# Patient Record
Sex: FEMALE | Race: WHITE | Employment: FULL TIME | URBAN - METROPOLITAN AREA
[De-identification: names, ages, dates, MRNs, and addresses within clinical notes are randomized per-mention and may not be internally consistent; named-entity substitution may affect disease eponyms.]

---

## 2017-03-22 ENCOUNTER — OFFICE VISIT (OUTPATIENT)
Dept: PRIMARY CARE CLINIC | Age: 47
End: 2017-03-22

## 2017-03-22 VITALS
SYSTOLIC BLOOD PRESSURE: 136 MMHG | TEMPERATURE: 98.2 F | HEIGHT: 64 IN | OXYGEN SATURATION: 98 % | BODY MASS INDEX: 28.89 KG/M2 | DIASTOLIC BLOOD PRESSURE: 87 MMHG | HEART RATE: 89 BPM | RESPIRATION RATE: 16 BRPM | WEIGHT: 169.2 LBS

## 2017-03-22 DIAGNOSIS — S29.011A CHEST WALL MUSCLE STRAIN, INITIAL ENCOUNTER: Primary | ICD-10-CM

## 2017-03-22 NOTE — PROGRESS NOTES
Chief Complaint   Patient presents with    Chest Pain     Chest discomfort on the left side of the chest and radiating down left arm for past two days. Tight left neck. Felt more with deep breaths

## 2017-03-22 NOTE — PROGRESS NOTES
Subjective:   Jaja Chaudhry is a 41 y.o. female who complains of left chest discomfort for 2 days, stable since that time. The pain is described as \"aching\". The pain is felt when twisting and reaching for something. She denies any nausea, SOB, sweating. 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 She reports a history of chest muscle strains, usually affecting left side, and relates it may be due to having previous L shoulder surgery. She has not taken anything for pain. She reports being under a lot of stress lately, reports \"good stress\". Recently drove on a long, tense drive and is getting a new job which is confidential at this time. She decided to come in today as she had her biometric screening here. Denies history of HTN but has had a few high readings in past.  Tries to eat well and do yoga which she plans to do this evening. Mostly exercises in warmer months. She is a non-smoker. Denies first degree relative with heart disease. Grandfather  of MI age 39. BP and pulse was checked today at school - BP - 140/84, P - 80    Past Medical History:   Diagnosis Date    Asthma      Past Surgical History:   Procedure Laterality Date    HX HEENT      wisdom teeth extraction    HX ORTHOPAEDIC      left knee arthroscopy    HX ORTHOPAEDIC      left shoulder arthroscopy    HX ORTHOPAEDIC      left foot schwanoma excision     Allergies   Allergen Reactions    Penicillins Nausea Only    Sulfa (Sulfonamide Antibiotics) Nausea Only       Review of Systems  Pertinent items are noted in HPI.     Objective:     Visit Vitals    /87    Pulse 89    Temp 98.2 °F (36.8 °C) (Oral)    Resp 16    Ht 5' 4\" (1.626 m)    Wt 169 lb 3.2 oz (76.7 kg)    SpO2 98%    BMI 29.04 kg/m2     General:  alert, cooperative, no distress   Eyes: negative   Ears: normal TM's and external ear canals AU   Sinuses: Normal paranasal sinuses without tenderness   Mouth:  Lips, mucosa, and tongue normal. Teeth and gums normal and normal findings: oropharynx pink & moist without lesions or evidence of thrush   Neck: supple, symmetrical, trachea midline and no adenopathy. Heart: S1 and S2 normal, no murmurs noted. Mild tenderness to palpation to left anterior chest 4th-5th rib area, mid-clavicular line   Lungs: clear to auscultation bilaterally        12 lead EKG - WNL    Assessment/Plan:       ICD-10-CM ICD-9-CM    1. Chest wall muscle strain, initial encounter S29.011A 848.8 AMB POC EKG ROUTINE W/ 12 LEADS, INTER & REP     Unable to access biometric screening data (Glu/chol) as this data is not in CC. Recommend Ibuprofen or Aleve and warm moist compresses to chest.  If any worsening or severe CP, pt was advised to go to ED. Recommend full CPE with PCP in near future. RTC prn.       Modesta Wade, PIERRE

## 2017-03-22 NOTE — PATIENT INSTRUCTIONS
Musculoskeletal Chest Pain: Care Instructions  Your Care Instructions  Chest pain is not always a sign that something is wrong with your heart or that you have another serious problem. The doctor thinks your chest pain is caused by strained muscles or ligaments, inflamed chest cartilage, or another problem in your chest, rather than by your heart. You may need more tests to find the cause of your chest pain. Follow-up care is a key part of your treatment and safety. Be sure to make and go to all appointments, and call your doctor if you are having problems. Its also a good idea to know your test results and keep a list of the medicines you take. How can you care for yourself at home? · Take pain medicines exactly as directed. ¨ If the doctor gave you a prescription medicine for pain, take it as prescribed. ¨ If you are not taking a prescription pain medicine, ask your doctor if you can take an over-the-counter medicine. · Rest and protect the sore area. · Stop, change, or take a break from any activity that may be causing your pain or soreness. · Put ice or a cold pack on the sore area for 10 to 20 minutes at a time. Try to do this every 1 to 2 hours for the next 3 days (when you are awake) or until the swelling goes down. Put a thin cloth between the ice and your skin. · After 2 or 3 days, apply a heating pad set on low or a warm cloth to the area that hurts. Some doctors suggest that you go back and forth between hot and cold. · Do not wrap or tape your ribs for support. This may cause you to take smaller breaths, which could increase your risk of lung problems. · Mentholated creams such as Bengay or Icy Hot may soothe sore muscles. Follow the instructions on the package. · Follow your doctor's instructions for exercising. · Gentle stretching and massage may help you get better faster. Stretch slowly to the point just before pain begins, and hold the stretch for at least 15 to 30 seconds.  Do this 3 or 4 times a day. Stretch just after you have applied heat. · As your pain gets better, slowly return to your normal activities. Any increased pain may be a sign that you need to rest a while longer. When should you call for help? Call 911 anytime you think you may need emergency care. For example, call if:  · You have chest pain or pressure. This may occur with:  ¨ Sweating. ¨ Shortness of breath. ¨ Nausea or vomiting. ¨ Pain that spreads from the chest to the neck, jaw, or one or both shoulders or arms. ¨ Dizziness or lightheadedness. ¨ A fast or uneven pulse. After calling 911, chew 1 adult-strength aspirin. Wait for an ambulance. Do not try to drive yourself. · You have sudden chest pain and shortness of breath, or you cough up blood. Call your doctor now or seek immediate medical care if:  · You have any trouble breathing. · Your chest pain gets worse. · Your chest pain occurs consistently with exercise and is relieved by rest.  Watch closely for changes in your health, and be sure to contact your doctor if:  · Your chest pain does not get better after 1 week. Where can you learn more? Go to http://donald-mg.info/. Enter V293 in the search box to learn more about \"Musculoskeletal Chest Pain: Care Instructions. \"  Current as of: May 27, 2016  Content Version: 11.1  © 9666-4000 Healthwise, Incorporated. Care instructions adapted under license by [employer] (which disclaims liability or warranty for this information). If you have questions about a medical condition or this instruction, always ask your healthcare professional. [place] [address] any warranty or liability for your use of this information.

## 2017-03-22 NOTE — MR AVS SNAPSHOT
Visit Information Date & Time Provider Department Dept. Phone Encounter #  
 3/22/2017  3:00 PM David Parker NP [address] [facility] 90 [phone] Follow-up Instructions Return if symptoms worsen or fail to improve. Upcoming Health Maintenance Date Due DTaP/Tdap/Td series (1 - Tdap) 2/10/1993 PAP AKA CERVICAL CYTOLOGY 2/10/1993 INFLUENZA AGE 9 TO ADULT 8/1/2016 Allergies as of 3/22/2017  Review Complete On: 3/22/2017 By: David Parker NP Severity Noted Reaction Type Reactions Penicillins  04/05/2014   Side Effect Nausea Only Sulfa (Sulfonamide Antibiotics)  04/05/2014   Side Effect Nausea Only Current Immunizations  Never Reviewed Name Date Influenza Vaccine PF 10/6/2015 Not reviewed this visit You Were Diagnosed With   
  
 Codes Comments Chest wall muscle strain, initial encounter    -  Primary ICD-10-CM: B72.729Q ICD-9-CM: 465. 8 Vitals BP Pulse Temp Resp Height(growth percentile) Weight(growth percentile) 136/87 89 98.2 °F (36.8 °C) (Oral) 16 5' 4\" (1.626 m) 169 lb 3.2 oz (76.7 kg) SpO2 BMI OB Status Smoking Status 98% 29.04 kg/m2 Medically Induced Never Smoker Vitals History BMI and BSA Data Body Mass Index Body Surface Area 29.04 kg/m 2 1.86 m 2 Preferred Pharmacy Pharmacy Name Phone [facility] [address] [place] [address] [address] [facility], [place] [address] [phone] Your Updated Medication List  
  
   
This list is accurate as of: 3/22/17  3:54 PM.  Always use your most recent med list.  
  
  
  
  
 albuterol 90 mcg/actuation inhaler Commonly known as:  PROVENTIL HFA, VENTOLIN HFA, PROAIR HFA Take  by inhalation. CLARITIN 10 mg tablet Generic drug:  loratadine Take 10 mg by mouth daily. norethindrone-ethinyl estradiol 1 mg-20 mcg (21)/75 mg (7) Tab Commonly known as:  Gib Kurk FE 1/20 Take  by mouth. SINGULAIR 10 mg tablet Generic drug:  montelukast  
Take 10 mg by mouth daily. We Performed the Following AMB POC EKG ROUTINE W/ 12 LEADS, INTER & REP [15710 CPT(R)] Follow-up Instructions Return if symptoms worsen or fail to improve. Patient Instructions Musculoskeletal Chest Pain: Care Instructions Your Care Instructions Chest pain is not always a sign that something is wrong with your heart or that you have another serious problem. The doctor thinks your chest pain is caused by strained muscles or ligaments, inflamed chest cartilage, or another problem in your chest, rather than by your heart. You may need more tests to find the cause of your chest pain. Follow-up care is a key part of your treatment and safety. Be sure to make and go to all appointments, and call your doctor if you are having problems. Its also a good idea to know your test results and keep a list of the medicines you take. How can you care for yourself at home? · Take pain medicines exactly as directed. ¨ If the doctor gave you a prescription medicine for pain, take it as prescribed. ¨ If you are not taking a prescription pain medicine, ask your doctor if you can take an over-the-counter medicine. · Rest and protect the sore area. · Stop, change, or take a break from any activity that may be causing your pain or soreness. · Put ice or a cold pack on the sore area for 10 to 20 minutes at a time. Try to do this every 1 to 2 hours for the next 3 days (when you are awake) or until the swelling goes down. Put a thin cloth between the ice and your skin. · After 2 or 3 days, apply a heating pad set on low or a warm cloth to the area that hurts. Some doctors suggest that you go back and forth between hot and cold. · Do not wrap or tape your ribs for support. This may cause you to take smaller breaths, which could increase your risk of lung problems. · Mentholated creams such as Bengay or Icy Hot may soothe sore muscles. Follow the instructions on the package. · Follow your doctor's instructions for exercising. · Gentle stretching and massage may help you get better faster. Stretch slowly to the point just before pain begins, and hold the stretch for at least 15 to 30 seconds. Do this 3 or 4 times a day. Stretch just after you have applied heat. · As your pain gets better, slowly return to your normal activities. Any increased pain may be a sign that you need to rest a while longer. When should you call for help? Call 911 anytime you think you may need emergency care. For example, call if: 
· You have chest pain or pressure. This may occur with: ¨ Sweating. ¨ Shortness of breath. ¨ Nausea or vomiting. ¨ Pain that spreads from the chest to the neck, jaw, or one or both shoulders or arms. ¨ Dizziness or lightheadedness. ¨ A fast or uneven pulse. After calling 911, chew 1 adult-strength aspirin. Wait for an ambulance. Do not try to drive yourself. · You have sudden chest pain and shortness of breath, or you cough up blood. Call your doctor now or seek immediate medical care if: 
· You have any trouble breathing. · Your chest pain gets worse. · Your chest pain occurs consistently with exercise and is relieved by rest. 
Watch closely for changes in your health, and be sure to contact your doctor if: 
· Your chest pain does not get better after 1 week. Where can you learn more? Go to http://donald-mg.info/. Enter V293 in the search box to learn more about \"Musculoskeletal Chest Pain: Care Instructions. \" Current as of: May 27, 2016 Content Version: 11.1 © 8373-3445 y prime. Care instructions adapted under license by [employer] (which disclaims liability or warranty for this information).  If you have questions about a medical condition or this instruction, always ask your healthcare professional. [address] any warranty or liability for your use of this information. Introducing [facility] & HEALTH SERVICES! Farooq Hercules introduces Woqu.com patient portal. Now you can access parts of your medical record, email your doctor's office, and request medication refills online. 1. In your internet browser, go to [email or web address] in My [email or web address] 2. Click on the First Time User? Click Here link in the Sign In box. You will see the New Member Sign Up page. 3. Enter your [email or web address].com Access Code exactly as it appears below. You will not need to use this code after youve completed the sign-up process. If you do not sign up before the expiration date, you must request a new code. · [email or web address] Access Code: [email or web address] Expires: 6/20/2017  3:36 PM 
 
4. Enter the last four digits of your Social Security Number (xxxx) and Date of Birth (mm/dd/yyyy) as indicated and click Submit. You will be taken to the next sign-up page. 5. Create a [email or web address] ID. This will be your [email or web address] login ID and cannot be changed, so think of one that is secure and easy to remember. 6. Create a Woqu.com password. You can change your password at any time. 7. Enter your Password Reset Question and Answer. This can be used at a later time if you forget your password. 8. Enter your e-mail address. You will receive e-mail notification when new information is available in [address] 9. Click Sign Up. You can now view and download portions of your medical record. 10. Click the Download Summary menu link to download a portable copy of your medical information. If you have questions, please visit the Frequently Asked Questions section of the [email or web address] website. Remember, Woqu.com is NOT to be used for urgent needs. For medical emergencies, dial 911. Now available from your iPhone and Android! Please provide this summary of care documentation to your next provider. Your primary care clinician is listed as Trinh Hartmann. If you have any questions after today's visit, please call [phone].

## 2017-05-30 ENCOUNTER — OFFICE VISIT (OUTPATIENT)
Dept: PRIMARY CARE CLINIC | Age: 47
End: 2017-05-30

## 2017-05-30 VITALS
TEMPERATURE: 98.3 F | WEIGHT: 173.2 LBS | HEIGHT: 64 IN | RESPIRATION RATE: 16 BRPM | BODY MASS INDEX: 29.57 KG/M2 | HEART RATE: 75 BPM | SYSTOLIC BLOOD PRESSURE: 131 MMHG | DIASTOLIC BLOOD PRESSURE: 88 MMHG | OXYGEN SATURATION: 98 %

## 2017-05-30 DIAGNOSIS — L02.91 ABSCESS: Primary | ICD-10-CM

## 2017-05-30 RX ORDER — DOXYCYCLINE 100 MG/1
100 CAPSULE ORAL 2 TIMES DAILY
Qty: 28 CAP | Refills: 0 | Status: SHIPPED | OUTPATIENT
Start: 2017-05-30 | End: 2017-06-07 | Stop reason: SDUPTHER

## 2017-05-30 NOTE — PROGRESS NOTES
Chief Complaint   Patient presents with   [address]     c/o tick bite on L breast, unsure of when she noticed, states school nurse this morning marked with marker, states she did use some otc topical ointment ot help      This note will not be viewable in [address]

## 2017-05-30 NOTE — PATIENT INSTRUCTIONS

## 2017-05-30 NOTE — MR AVS SNAPSHOT
Visit Information Date & Time Provider Department Dept. Phone Encounter #  
 5/30/2017  3:15 PM Keyanna Ocasio, [address] [place] [phone] Follow-up Instructions Return if symptoms worsen or fail to improve. Upcoming Health Maintenance Date Due DTaP/Tdap/Td series (1 - Tdap) 2/10/1993 PAP AKA CERVICAL CYTOLOGY 2/10/1993 INFLUENZA AGE 9 TO ADULT 8/1/2017 Allergies as of 5/30/2017  Review Complete On: 5/30/2017 By: Will Diaz LPN Severity Noted Reaction Type Reactions Penicillins  04/05/2014   Side Effect Nausea Only Sulfa (Sulfonamide Antibiotics)  04/05/2014   Side Effect Nausea Only Current Immunizations  Never Reviewed Name Date Influenza Vaccine PF 10/6/2015 Not reviewed this visit You Were Diagnosed With   
  
 Codes Comments Abscess    -  Primary ICD-10-CM: L02.91 
ICD-9-CM: 928. 9 Vitals BP Pulse Temp Resp Height(growth percentile) Weight(growth percentile) 131/88 (BP 1 Location: Left arm, BP Patient Position: Sitting) 75 98.3 °F (36.8 °C) (Oral) 16 5' 4\" (1.626 m) 173 lb 3.2 oz (78.6 kg) SpO2 BMI OB Status Smoking Status 98% 29.73 kg/m2 Medically Induced Never Smoker Vitals History BMI and BSA Data Body Mass Index Body Surface Area  
 29.73 kg/m 2 1.88 m 2 Preferred Pharmacy Pharmacy Name Phone [address] [place] [address], [address] [place] [address] Carlie Small Said [phone] Your Updated Medication List  
  
   
This list is accurate as of: 5/30/17  4:15 PM.  Always use your most recent med list.  
  
  
  
  
 albuterol 90 mcg/actuation inhaler Commonly known as:  PROVENTIL HFA, VENTOLIN HFA, PROAIR HFA Take 2 Puffs by inhalation every six (6) hours as needed. CLARITIN 10 mg tablet Generic drug:  loratadine Take 10 mg by mouth daily. doxycycline 100 mg capsule Commonly known as:  Julisa Monteiro Take 1 Cap by mouth two (2) times a day for 14 days. norethindrone-ethinyl estradiol 1 mg-20 mcg (21)/75 mg (7) Tab Commonly known as:  [place] Marianne FE 1/20 Take 1 Tab by mouth daily. SINGULAIR 10 mg tablet Generic drug:  montelukast  
Take 10 mg by mouth daily. Prescriptions Sent to Pharmacy Refills  
 doxycycline (MONODOX) 100 mg capsule 0 Sig: Take 1 Cap by mouth two (2) times a day for 14 days. Class: Normal  
 Pharmacy: Usman Lopez  at [address] #: [phone] Route: Oral  
  
Follow-up Instructions Return if symptoms worsen or fail to improve. Patient Instructions Skin Abscess: Care Instructions Your Care Instructions A skin abscess is a bacterial infection that forms a pocket of pus. A boil is a kind of skin abscess. The doctor may have cut an opening in the abscess so that the pus can drain out. You may have gauze in the cut so that the abscess will stay open and keep draining. You may need antibiotics. You will need to follow up with your doctor to make sure the infection has gone away. The doctor has checked you carefully, but problems can develop later. If you notice any problems or new symptoms, get medical treatment right away. Follow-up care is a key part of your treatment and safety. Be sure to make and go to all appointments, and call your doctor if you are having problems. It's also a good idea to know your test results and keep a list of the medicines you take. How can you care for yourself at home? · Apply warm and dry compresses, a heating pad set on low, or a hot water bottle 3 or 4 times a day for pain. Keep a cloth between the heat source and your skin. · If your doctor prescribed antibiotics, take them as directed. Do not stop taking them just because you feel better. You need to take the full course of antibiotics. · Take pain medicines exactly as directed. ¨ If the doctor gave you a prescription medicine for pain, take it as prescribed. ¨ If you are not taking a prescription pain medicine, ask your doctor if you can take an over-the-counter medicine. · Keep your bandage clean and dry. Change the bandage whenever it gets wet or dirty, or at least one time a day. · If the abscess was packed with gauze: 
¨ Keep follow-up appointments to have the gauze changed or removed. If the doctor instructed you to remove the gauze, gently pull out all of the gauze when your doctor tells you to. ¨ After the gauze is removed, soak the area in warm water for 15 to 20 minutes 2 times a day, until the wound closes. When should you call for help? Call your doctor now or seek immediate medical care if: 
· You have signs of worsening infection, such as: 
¨ Increased pain, swelling, warmth, or redness. ¨ Red streaks leading from the infected skin. ¨ Pus draining from the wound. ¨ A fever. Watch closely for changes in your health, and be sure to contact your doctor if: 
· You do not get better as expected. Where can you learn more? Go to http://donald-mg.info/. Enter Z411 in the search box to learn more about \"Skin Abscess: Care Instructions. \" Current as of: October 13, 2016 Content Version: 11.2 © 1488-4760 SpeechVive. Care instructions adapted under license by [employer] (which disclaims liability or warranty for this information). If you have questions about a medical condition or this instruction, always ask your healthcare professional. [place] [address] any warranty or liability for your use of this information. Introducing [facility]! Delvin Kline introduces [employer] patient portal. Now you can access parts of your medical record, email your doctor's office, and request medication refills online. 1. In your internet browser, go to [email or web address] [email or web address]/[email or web address] Media 2. Click on the First Time User? Click Here link in the Sign In box. You will see the New Member Sign Up page. 3. Enter your [employer] Access Code exactly as it appears below. You will not need to use this code after youve completed the sign-up process. If you do not sign up before the expiration date, you must request a new code. · [employer] Access Code: [email or web address] Expires: 6/20/2017  3:36 PM 
 
4. Enter the last four digits of your Social Security Number (xxxx) and Date of Birth (mm/dd/yyyy) as indicated and click Submit. You will be taken to the next sign-up page. 5. Create a [employer] ID. This will be your [employer] login ID and cannot be changed, so think of one that is secure and easy to remember. 6. Create a [employer] password. You can change your password at any time. 7. Enter your Password Reset Question and Answer. This can be used at a later time if you forget your password. 8. Enter your e-mail address. You will receive e-mail notification when new information is available in [address] 9. Click Sign Up. You can now view and download portions of your medical record. 10. Click the Download Summary menu link to download a portable copy of your medical information. If you have questions, please visit the Frequently Asked Questions section of the [employer] website. Remember, [employer] is NOT to be used for urgent needs. For medical emergencies, dial 911. Now available from your iPhone and Android! Please provide this summary of care documentation to your next provider. Your primary care clinician is listed as Jerman Phelps. If you have any questions after today's visit, please call [phone].

## 2017-06-02 NOTE — PROGRESS NOTES
Subjective:      Alvaro Blanchard is an 41 y.o. female who presents for evaluation of a bite wound to the left breast. History of bite: possible tick bite. The bite  occurred 2 days ago. The patient reports no numbness. There were not other injuries. The tetanus status is up to date. Patient Active Problem List   Diagnosis Code   (none) - all problems resolved or deleted     There are no active problems to display for this patient. Current Outpatient Prescriptions   Medication Sig Dispense Refill    doxycycline (MONODOX) 100 mg capsule Take 1 Cap by mouth two (2) times a day for 14 days. 28 Cap 0    albuterol (PROVENTIL HFA, VENTOLIN HFA, PROAIR HFA) 90 mcg/actuation inhaler Take 2 Puffs by inhalation every six (6) hours as needed.  norethindrone-ethinyl estradiol (JUNEL FE 1/20) 1 mg-20 mcg (21)/75 mg (7) per tablet Take 1 Tab by mouth daily.  montelukast (SINGULAIR) 10 mg tablet Take 10 mg by mouth daily.  loratadine (CLARITIN) 10 mg tablet Take 10 mg by mouth daily.        Allergies   Allergen Reactions    Penicillins Nausea Only    Sulfa (Sulfonamide Antibiotics) Nausea Only     Past Medical History:   Diagnosis Date    Asthma      Past Surgical History:   Procedure Laterality Date    HX HEENT      wisdom teeth extraction    HX ORTHOPAEDIC      left knee arthroscopy    HX ORTHOPAEDIC      left shoulder arthroscopy    HX ORTHOPAEDIC      left foot schwanoma excision     Family History   Problem Relation Age of Onset    Cancer Father     Other Brother      cerebral palsy     No Known Problems Mother     No Known Problems Sister     No Known Problems Maternal Aunt     No Known Problems Maternal Uncle     No Known Problems Paternal Aunt     No Known Problems Paternal Uncle     No Known Problems Maternal Grandmother     No Known Problems Maternal Grandfather     No Known Problems Paternal Grandmother     No Known Problems Paternal Grandfather      Social History   Substance Use Topics    Smoking status: Never Smoker    Smokeless tobacco: Never Used    Alcohol use Yes      Comment: occasional        Objective:        Visit Vitals    /88 (BP 1 Location: Left arm, BP Patient Position: Sitting)    Pulse 75    Temp 98.3 °F (36.8 °C) (Oral)    Resp 16    Ht 5' 4\" (1.626 m)    Wt 173 lb 3.2 oz (78.6 kg)    SpO2 98%    BMI 29.73 kg/m2     There is a 8cm x 5 cm area of erythemia with a deep darker red area of swelling and redness in center area. Examination of the wound for foreign bodies and devitalized tissue showed none. Examination of the surrounding area for neural or vascular damage showed none. Assessment/Plan:   Bite as described above. This is more swollen and red than I would usually expect to see on a bite of anykind therefore I am goinf to treat her with doxycycline 100 mg BID for 10-14 days. If the area has any increasing redness or swelling, she will need to see the ED or a surgeon for further evaluation of the area. Tetanus immunization indicated: yes  Rabies risk: Minimal risk. The wound was dressed and antibiotic ointment was applied. ICD-10-CM ICD-9-CM    1.  Abscess L02.91 682.9 doxycycline (MONODOX) 100 mg capsule

## 2017-06-06 ENCOUNTER — OFFICE VISIT (OUTPATIENT)
Dept: PRIMARY CARE CLINIC | Age: 47
End: 2017-06-06

## 2017-06-06 VITALS
WEIGHT: 173.8 LBS | HEIGHT: 64 IN | RESPIRATION RATE: 16 BRPM | OXYGEN SATURATION: 99 % | DIASTOLIC BLOOD PRESSURE: 85 MMHG | HEART RATE: 85 BPM | SYSTOLIC BLOOD PRESSURE: 134 MMHG | TEMPERATURE: 98.3 F | BODY MASS INDEX: 29.67 KG/M2

## 2017-06-06 DIAGNOSIS — L02.91 ABSCESS: Primary | ICD-10-CM

## 2017-06-06 NOTE — PROGRESS NOTES
Subjective:      Rock Iverson is an 41 y.o. female who presents for evaluation of a probable skin infection located on the left breast. She was here 1 week ago and was started on doxycycline and warm compresses. The abscess has localized and come up to the surface of her skin, however it has no drainage. The area is now 3cmx 2cm of red skin with a central hole, not quite open. Patient denies  drainage, fever, chills, nausea and vomiting. There is not a history trauma to the area. Treatment to date has included warm compresses and antibiotics started 7 days ago with moderate relief. Patient Active Problem List   Diagnosis Code   (none) - all problems resolved or deleted     There are no active problems to display for this patient. Current Outpatient Prescriptions   Medication Sig Dispense Refill    doxycycline (MONODOX) 100 mg capsule Take 1 Cap by mouth two (2) times a day for 14 days. 28 Cap 0    albuterol (PROVENTIL HFA, VENTOLIN HFA, PROAIR HFA) 90 mcg/actuation inhaler Take 2 Puffs by inhalation every six (6) hours as needed.  norethindrone-ethinyl estradiol (JUNEL FE 1/20) 1 mg-20 mcg (21)/75 mg (7) per tablet Take 1 Tab by mouth daily.  montelukast (SINGULAIR) 10 mg tablet Take 10 mg by mouth daily.  loratadine (CLARITIN) 10 mg tablet Take 10 mg by mouth daily.        Allergies   Allergen Reactions    Penicillins Nausea Only    Sulfa (Sulfonamide Antibiotics) Nausea Only     Past Medical History:   Diagnosis Date    Asthma      Past Surgical History:   Procedure Laterality Date    HX HEENT      wisdom teeth extraction    HX ORTHOPAEDIC      left knee arthroscopy    HX ORTHOPAEDIC      left shoulder arthroscopy    HX ORTHOPAEDIC      left foot schwanoma excision     Family History   Problem Relation Age of Onset    Cancer Father     Other Brother      cerebral palsy     No Known Problems Mother     No Known Problems Sister     No Known Problems Maternal Aunt     No Known Problems Maternal Uncle     No Known Problems Paternal Aunt     No Known Problems Paternal Uncle     No Known Problems Maternal Grandmother     No Known Problems Maternal Grandfather     No Known Problems Paternal Grandmother     No Known Problems Paternal Grandfather      Social History   Substance Use Topics    Smoking status: Never Smoker    Smokeless tobacco: Never Used    Alcohol use Yes      Comment: occasional        Objective:     Visit Vitals    /85 (BP 1 Location: Left arm, BP Patient Position: Sitting)    Pulse 85    Temp 98.3 °F (36.8 °C) (Oral)    Resp 16    Ht 5' 4\" (1.626 m)    Wt 173 lb 12.8 oz (78.8 kg)    SpO2 99%    BMI 29.83 kg/m2     General appearance: alert, well appearing, and in no distress. Skin exam: erythema, warmth and fluctuant abscessnoted on the left breast.    Assessment/Plan:     fluctuant abscess as described  I do not believe this to be a high risk lesion for MRSA. Thus I am treating the patient with I & D which was preformed under aseptic technique, using 1.5 ml of 2% Lidocaine, the area was cleaned with betadine, opened with a scalpel . 5 cm and drained a moderate amount of thick purulent yellow drainage which was cultured. A gauze wick was inserted with a small amount of packing after the area was flushed with Normal saline. Antibiotic ointment and dry dressing was applied. Instructions were given for care, and she will return tomorrow to have the packing removed, evaluate the area, and treat from there. She will continue the Doxycycline, and verbalized understanding of all instructions. She tolerated the procedure well with a scant amount of bleeding at the site which stopped with dressing. Antibiotics given. Educational material distributed. Wound cleansed. Wound debrided. Wound dressing applied. Follow-up in 1 day for wound check. ICD-10-CM ICD-9-CM    1.  Abscess L02.91 682.9 AEROBIC BACTERIAL CULTURE

## 2017-06-06 NOTE — PROGRESS NOTES
Chief Complaint   Patient presents with    Breast pain     f/u from visit on 5/30/17, pt states she has been takinb abx and using heating pad, feels as though it has gotten better,     This note will not be viewable in [address]

## 2017-06-06 NOTE — MR AVS SNAPSHOT
Visit Information Date & Time Provider Department Dept. Phone Encounter #  
 6/6/2017  2:45 PM Chichi Screen, [address] [facility] [id number] Follow-up Instructions Return if symptoms worsen or fail to improve. Upcoming Health Maintenance Date Due DTaP/Tdap/Td series (1 - Tdap) 2/10/1993 PAP AKA CERVICAL CYTOLOGY 2/10/1993 INFLUENZA AGE 9 TO ADULT 8/1/2017 Allergies as of 6/6/2017  Review Complete On: 6/6/2017 By: Aden Hirsch LPN Severity Noted Reaction Type Reactions Penicillins  04/05/2014   Side Effect Nausea Only Sulfa (Sulfonamide Antibiotics)  04/05/2014   Side Effect Nausea Only Current Immunizations  Never Reviewed Name Date Influenza Vaccine PF 10/6/2015 Not reviewed this visit You Were Diagnosed With   
  
 Codes Comments Abscess    -  Primary ICD-10-CM: L02.91 
ICD-9-CM: 825. 9 Vitals BP Pulse Temp Resp Height(growth percentile) Weight(growth percentile) 134/85 (BP 1 Location: Left arm, BP Patient Position: Sitting) 85 98.3 °F (36.8 °C) (Oral) 16 5' 4\" (1.626 m) 173 lb 12.8 oz (78.8 kg) SpO2 BMI OB Status Smoking Status 99% 29.83 kg/m2 Medically Induced Never Smoker BMI and BSA Data Body Mass Index Body Surface Area  
 29.83 kg/m 2 1.89 m 2 Preferred Pharmacy Pharmacy Name Phone [address] [place] [address], [address] [place] [address] Carlie Small Said [phone] Your Updated Medication List  
  
   
This list is accurate as of: 6/6/17  4:31 PM.  Always use your most recent med list.  
  
  
  
  
 albuterol 90 mcg/actuation inhaler Commonly known as:  PROVENTIL HFA, VENTOLIN HFA, PROAIR HFA Take 2 Puffs by inhalation every six (6) hours as needed. CLARITIN 10 mg tablet Generic drug:  loratadine Take 10 mg by mouth daily. doxycycline 100 mg capsule Commonly known as:  Barrington May Take 1 Cap by mouth two (2) times a day for 14 days. norethindrone-ethinyl estradiol 1 mg-20 mcg (21)/75 mg (7) Tab Commonly known as:  Janey CHERY 1/20 Take 1 Tab by mouth daily. SINGULAIR 10 mg tablet Generic drug:  montelukast  
Take 10 mg by mouth daily. Follow-up Instructions Return if symptoms worsen or fail to improve. Patient Instructions Skin Abscess: Care Instructions Your Care Instructions A skin abscess is a bacterial infection that forms a pocket of pus. A boil is a kind of skin abscess. The doctor may have cut an opening in the abscess so that the pus can drain out. You may have gauze in the cut so that the abscess will stay open and keep draining. You may need antibiotics. You will need to follow up with your doctor to make sure the infection has gone away. The doctor has checked you carefully, but problems can develop later. If you notice any problems or new symptoms, get medical treatment right away. Follow-up care is a key part of your treatment and safety. Be sure to make and go to all appointments, and call your doctor if you are having problems. It's also a good idea to know your test results and keep a list of the medicines you take. How can you care for yourself at home? · Apply warm and dry compresses, a heating pad set on low, or a hot water bottle 3 or 4 times a day for pain. Keep a cloth between the heat source and your skin. · If your doctor prescribed antibiotics, take them as directed. Do not stop taking them just because you feel better. You need to take the full course of antibiotics. · Take pain medicines exactly as directed. ¨ If the doctor gave you a prescription medicine for pain, take it as prescribed. ¨ If you are not taking a prescription pain medicine, ask your doctor if you can take an over-the-counter medicine. · Keep your bandage clean and dry.  Change the bandage whenever it gets wet or dirty, or at least one time a day. · If the abscess was packed with gauze: 
¨ Keep follow-up appointments to have the gauze changed or removed. If the doctor instructed you to remove the gauze, gently pull out all of the gauze when your doctor tells you to. ¨ After the gauze is removed, soak the area in warm water for 15 to 20 minutes 2 times a day, until the wound closes. When should you call for help? Call your doctor now or seek immediate medical care if: 
· You have signs of worsening infection, such as: 
¨ Increased pain, swelling, warmth, or redness. ¨ Red streaks leading from the infected skin. ¨ Pus draining from the wound. ¨ A fever. Watch closely for changes in your health, and be sure to contact your doctor if: 
· You do not get better as expected. Where can you learn more? Go to http://donald-mg.info/. Enter F973 in the search box to learn more about \"Skin Abscess: Care Instructions. \" Current as of: October 13, 2016 Content Version: 11.2 © 7179-6474 [employer]. Care instructions adapted under license by [employer] (which disclaims liability or warranty for this information). If you have questions about a medical condition or this instruction, always ask your healthcare professional. [address] any warranty or liability for your use of this information. Introducing [facility]! Martha Cleverly introduces Zeb[employer] patient portal. Now you can access parts of your medical record, email your doctor's office, and request medication refills online. 1. In your internet browser, go to [email or web address] 2. Click on the First Time User? Click Here link in the Sign In box. You will see the New Member Sign Up page. 3. Enter your [employer] Access Code exactly as it appears below. You will not need to use this code after youve completed the sign-up process.  If you do not sign up before the expiration date, you must request a new code. · [employer] Access Code: [email or web address] Expires: 6/20/2017  3:36 PM 
 
4. Enter the last four digits of your Social Security Number (xxxx) and Date of Birth (mm/dd/yyyy) as indicated and click Submit. You will be taken to the next sign-up page. 5. Create a [employer] ID. This will be your [employer] login ID and cannot be changed, so think of one that is secure and easy to remember. 6. Create a [employer] password. You can change your password at any time. 7. Enter your Password Reset Question and Answer. This can be used at a later time if you forget your password. 8. Enter your e-mail address. You will receive e-mail notification when new information is available in [address]. 9. Click Sign Up. You can now view and download portions of your medical record. 10. Click the Download Summary menu link to download a portable copy of your medical information. If you have questions, please visit the Frequently Asked Questions section of the [employer] website. Remember, [employer] is NOT to be used for urgent needs. For medical emergencies, dial 911. Now available from your iPhone and Android! Please provide this summary of care documentation to your next provider. Your primary care clinician is listed as Lexi Pride. If you have any questions after today's visit, please call [phone].

## 2017-06-06 NOTE — PATIENT INSTRUCTIONS

## 2017-06-07 ENCOUNTER — OFFICE VISIT (OUTPATIENT)
Dept: FAMILY MEDICINE CLINIC | Age: 47
End: 2017-06-07

## 2017-06-07 VITALS
RESPIRATION RATE: 18 BRPM | WEIGHT: 173 LBS | HEART RATE: 76 BPM | HEIGHT: 64 IN | OXYGEN SATURATION: 99 % | TEMPERATURE: 98.9 F | BODY MASS INDEX: 29.53 KG/M2 | DIASTOLIC BLOOD PRESSURE: 90 MMHG | SYSTOLIC BLOOD PRESSURE: 133 MMHG

## 2017-06-07 DIAGNOSIS — L02.91 ABSCESS: Primary | ICD-10-CM

## 2017-06-07 DIAGNOSIS — B37.31 VAGINAL CANDIDA: ICD-10-CM

## 2017-06-07 RX ORDER — FLUCONAZOLE 100 MG/1
TABLET ORAL
Qty: 7 TAB | Refills: 0 | Status: SHIPPED | OUTPATIENT
Start: 2017-06-07 | End: 2017-07-14 | Stop reason: ALTCHOICE

## 2017-06-07 RX ORDER — DOXYCYCLINE 100 MG/1
100 CAPSULE ORAL 2 TIMES DAILY
Qty: 10 CAP | Refills: 20 | Status: SHIPPED | OUTPATIENT
Start: 2017-06-07 | End: 2017-07-14 | Stop reason: ALTCHOICE

## 2017-06-07 NOTE — PATIENT INSTRUCTIONS
Wound Care: After Your Visit  Your Care Instructions  Taking good care of your wound at home will help it heal quickly and reduce your chance of infection. The doctor has checked you carefully, but problems can develop later. If you notice any problems or new symptoms, get medical treatment right away. Follow-up care is a key part of your treatment and safety. Be sure to make and go to all appointments, and call your doctor if you are having problems. It's also a good idea to know your test results and keep a list of the medicines you take. How can you care for yourself at home? · Clean the area with soap and water 2 times a day unless your doctor gives you different instructions. Don't use hydrogen peroxide or alcohol, which can slow healing. ¨ You may cover the wound with a thin layer of antibiotic ointment, such as bacitracin, and a nonstick bandage. ¨ Apply more ointment and replace the bandage as needed. · Take pain medicines exactly as directed. Some pain is normal with a wound, but do not ignore pain that is getting worse instead of better. You could have an infection. ¨ If the doctor gave you a prescription medicine for pain, take it as prescribed. ¨ If you are not taking a prescription pain medicine, ask your doctor if you can take an over-the-counter medicine. · Your doctor may have closed your wound with stitches (sutures), staples, or skin glue. ¨ If you have stitches, your doctor may remove them after several days to 2 weeks. Or you may have stitches that dissolve on their own. ¨ If you have staples, your doctor may remove them after 7 to 10 days. ¨ If your wound was closed with skin glue, the glue will wear off in a few days to 2 weeks. When should you call for help? Call your doctor now or seek immediate medical care if:  · You have signs of infection, such as:  ¨ Increased pain, swelling, warmth, or redness near the wound. ¨ Red streaks leading from the wound.   ¨ Pus draining from the wound. ¨ A fever. · You bleed so much from your incision that you soak one or more bandages over 2 to 4 hours. Watch closely for changes in your health, and be sure to contact your doctor if:  · The wound is not getting better each day. Where can you learn more? Go to Zoomdata.be  Enter M973 in the search box to learn more about \"Wound Care: After Your Visit. \"   © 7959-1691 [employer]. Care instructions adapted under license by Gabrielle Alvarez (which disclaims liability or warranty for this information). This care instruction is for use with your licensed healthcare professional. If you have questions about a medical condition or this instruction, always ask your healthcare professional. [address] any warranty or liability for your use of this information. Content Version: 83.9.989276;  Last Revised: April 23, 2012

## 2017-06-07 NOTE — PROGRESS NOTES
Chief Complaint   Patient presents with    Wound Check     left breast wound to be redressed     she is a 41y.o. year old female who presents for evalution. She had an I & D completed on her left breast abscess yesterday at the [facility] clinic. She is returning today for a wound recheck and packing if needed. The area has not drained through the dressing applied and her pain level is down to a 1/10. She has had no fever, nausea, vomiting or chills. She is leaving with her spouse for a vacation to [place], [place] in 3 days. Beach, pool and other outside activities have been planned. She is very hopeful to get this healed up if possible before leaving. Due to her planned vacation, and the good response of the Doxycycline, I have continued it twice daily for 10 more days, although she can stop early if it is entirely healed. Reviewed PmHx, RxHx, FmHx, SocHx, AllgHx and updated and dated in the chart. Review of Systems - negative except as listed above in the HPI    Objective:     Vitals:    06/07/17 1527   BP: 133/90   Pulse: 76   Resp: 18   Temp: 98.9 °F (37.2 °C)   TempSrc: Oral   SpO2: 99%   Weight: 173 lb (78.5 kg)   Height: 5' 4\" (1.626 m)       Current Outpatient Prescriptions   Medication Sig    doxycycline (MONODOX) 100 mg capsule Take 1 Cap by mouth two (2) times a day.  fluconazole (DIFLUCAN) 100 mg tablet Take one tab today, then every other day as needed for vaginal yeast infection symptoms.  albuterol (PROVENTIL HFA, VENTOLIN HFA, PROAIR HFA) 90 mcg/actuation inhaler Take 2 Puffs by inhalation every six (6) hours as needed.  montelukast (SINGULAIR) 10 mg tablet Take 10 mg by mouth daily.  loratadine (CLARITIN) 10 mg tablet Take 10 mg by mouth daily.  norethindrone-ethinyl estradiol (JUNEL FE 1/20) 1 mg-20 mcg (21)/75 mg (7) per tablet Take 1 Tab by mouth daily. No current facility-administered medications for this visit.         Physical Examination: General appearance - alert, well appearing, and in no distress  Chest - clear to auscultation, no wheezes, rales or rhonchi, symmetric air entry  Heart - normal rate, regular rhythm, normal S1, S2, no murmurs, rubs, clicks or gallops  Breasts - abscess located on the lower left breast.  Skin -Abscess is post I & D x 24 hours, redness has decreased, pain decreased and a scant amount of tan purulent drainage was noted on dressing and over 1 cm of gauze wick/ packing. The opening is 0.3 cm, depth is 0.2 cm with no tunneling noted. Wound was irrigated with normal saline, packing reapplied wet with sterile NS and triple antibiotic ointment. Dry non-stick pad and 4x4 applied with Tegaderm. She has a 0.5 cm tape abrasion to the left of the wound under her breast. Antibiotic ointment and band aide applied. Patient tolerated procedure well, and will return tomorrow for a repeat, probable last day needed. Assessment/ Plan:   Earle Miller was seen today for wound check. Diagnoses and all orders for this visit:    Abscess  -     doxycycline (MONODOX) 100 mg capsule; Take 1 Cap by mouth two (2) times a day. Vaginal candida  -     fluconazole (DIFLUCAN) 100 mg tablet; Take one tab today, then every other day as needed for vaginal yeast infection symptoms. Return tomorrow for wound recheck and packing if needed. Keep the dressing intact and dry for the next 24 hours until visit. I have discussed the diagnosis with the patient and the intended plan as seen in the above orders. The patient has received an after-visit summary and questions were answered concerning future plans. Pt conveyed understanding of plan.     Medication Side Effects and Warnings were discussed with patient      Matty Sosa NP

## 2017-06-07 NOTE — MR AVS SNAPSHOT
Visit Information Date & Time Provider Department Dept. Phone Encounter #  
 6/7/2017  3:30 PM Matt Apple, [address] [phone] [id number] Follow-up Instructions Return if symptoms worsen or fail to improve. Upcoming Health Maintenance Date Due DTaP/Tdap/Td series (1 - Tdap) 2/10/1993 PAP AKA CERVICAL CYTOLOGY 2/10/1993 INFLUENZA AGE 9 TO ADULT 8/1/2017 Allergies as of 6/7/2017  Review Complete On: 6/7/2017 By: Ashley Moran LPN Severity Noted Reaction Type Reactions Penicillins  04/05/2014   Side Effect Nausea Only Sulfa (Sulfonamide Antibiotics)  04/05/2014   Side Effect Nausea Only Current Immunizations  Never Reviewed Name Date Influenza Vaccine PF 10/6/2015 Not reviewed this visit You Were Diagnosed With   
  
 Codes Comments Abscess    -  Primary ICD-10-CM: L02.91 
ICD-9-CM: 241. 9 Vaginal candida     ICD-10-CM: B37.3 ICD-9-CM: 112.1 Vitals BP Pulse Temp Resp Height(growth percentile) Weight(growth percentile) 133/90 76 98.9 °F (37.2 °C) (Oral) 18 5' 4\" (1.626 m) 173 lb (78.5 kg) SpO2 BMI OB Status Smoking Status 99% 29.7 kg/m2 Medically Induced Never Smoker BMI and BSA Data Body Mass Index Body Surface Area  
 29.7 kg/m 2 1.88 m 2 Preferred Pharmacy Pharmacy Name Phone [facility] [place] [facility] [address] [place] [address] [phone] Your Updated Medication List  
  
   
This list is accurate as of: 6/7/17  3:59 PM.  Always use your most recent med list.  
  
  
  
  
 albuterol 90 mcg/actuation inhaler Commonly known as:  PROVENTIL HFA, VENTOLIN HFA, PROAIR HFA Take 2 Puffs by inhalation every six (6) hours as needed. CLARITIN 10 mg tablet Generic drug:  loratadine Take 10 mg by mouth daily. doxycycline 100 mg capsule Commonly known as:  Bigelow Carolyn Take 1 Cap by mouth two (2) times a day. fluconazole 100 mg tablet Commonly known as:  DIFLUCAN Take one tab today, then every other day as needed for vaginal yeast infection symptoms. norethindrone-ethinyl estradiol 1 mg-20 mcg (21)/75 mg (7) Tab Commonly known as:  Crystal Class FE 1/20 Take 1 Tab by mouth daily. SINGULAIR 10 mg tablet Generic drug:  montelukast  
Take 10 mg by mouth daily. Prescriptions Sent to Pharmacy Refills  
 doxycycline (MONODOX) 100 mg capsule 20 Sig: Take 1 Cap by mouth two (2) times a day. Class: Normal  
 Pharm[facility] [id number] [place] [address] [facility] [address] Ph #: [phone] Route: Oral  
 fluconazole (DIFLUCAN) 100 mg tablet 0 Sig: Take one tab today, then every other day as needed for vaginal yeast infection symptoms. Class: Normal  
 Pharmacy: [facility] [id number] [place] [address] [place] , [facility] [address] Ph #: [phone] Follow-up Instructions Return if symptoms worsen or fail to improve. Patient Instructions Wound Care: After Your Visit Your Care Instructions Taking good care of your wound at home will help it heal quickly and reduce your chance of infection. The doctor has checked you carefully, but problems can develop later. If you notice any problems or new symptoms, get medical treatment right away. Follow-up care is a key part of your treatment and safety. Be sure to make and go to all appointments, and call your doctor if you are having problems. It's also a good idea to know your test results and keep a list of the medicines you take. How can you care for yourself at home? · Clean the area with soap and water 2 times a day unless your doctor gives you different instructions. Don't use hydrogen peroxide or alcohol, which can slow healing. ¨ You may cover the wound with a thin layer of antibiotic ointment, such as bacitracin, and a nonstick bandage. ¨ Apply more ointment and replace the bandage as needed. · Take pain medicines exactly as directed. Some pain is normal with a wound, but do not ignore pain that is getting worse instead of better. You could have an infection. ¨ If the doctor gave you a prescription medicine for pain, take it as prescribed. ¨ If you are not taking a prescription pain medicine, ask your doctor if you can take an over-the-counter medicine. · Your doctor may have closed your wound with stitches (sutures), staples, or skin glue. ¨ If you have stitches, your doctor may remove them after several days to 2 weeks. Or you may have stitches that dissolve on their own. ¨ If you have staples, your doctor may remove them after 7 to 10 days. ¨ If your wound was closed with skin glue, the glue will wear off in a few days to 2 weeks. When should you call for help? Call your doctor now or seek immediate medical care if: 
· You have signs of infection, such as: 
¨ Increased pain, swelling, warmth, or redness near the wound. ¨ Red streaks leading from the wound. ¨ Pus draining from the wound. ¨ A fever. · You bleed so much from your incision that you soak one or more bandages over 2 to 4 hours. Watch closely for changes in your health, and be sure to contact your doctor if: · The wound is not getting better each day. Where can you learn more? Go to [employer] Enter O788 in the search box to learn more about \"Wound Care: After Your Visit. \"  
© 3408-5104 Healthwise, Incorporated. Care instructions adapted under license by Adeel Marrero (which disclaims liability or warranty for this information). This care instruction is for use with your licensed healthcare professional. If you have questions about a medical condition or this instruction, always ask your healthcare professional. [place] [address] any warranty or liability for your use of this information. Content Version: 61.4.584963; Last Revised: April 23, 2012 Introducing [facility]! [employer] introduces [employer] patient portal. Now you can access parts of your medical record, email your doctor's office, and request medication refills online. 1. In your internet browser, go to [email or web address][email or web address]/"[employer] 2. Click on the First Time User? Click Here link in the Sign In box. You will see the New Member Sign Up page. 3. Enter your [employer] Access Code exactly as it appears below. You will not need to use this code after youve completed the sign-up process. If you do not sign up before the expiration date, you must request a new code. · [employer] Access Code: [email or web address] Expires: 6/20/2017  3:36 PM 
 
4. Enter the last four digits of your Social Security Number (xxxx) and Date of Birth (mm/dd/yyyy) as indicated and click Submit. You will be taken to the next sign-up page. 5. Create a [employer] ID. This will be your [employer] login ID and cannot be changed, so think of one that is secure and easy to remember. 6. Create a [employer] password. You can change your password at any time. 7. Enter your Password Reset Question and Answer. This can be used at a later time if you forget your password. 8. Enter your e-mail address. You will receive e-mail notification when new information is available in [address] 9. Click Sign Up. You can now view and download portions of your medical record. 10. Click the Download Summary menu link to download a portable copy of your medical information. If you have questions, please visit the Frequently Asked Questions section of the [employer] website. Remember, [employer] is NOT to be used for urgent needs. For medical emergencies, dial 911. Now available from your iPhone and Android! Please provide this summary of care documentation to your next provider. Your primary care clinician is listed as Graham Durbin. If you have any questions after today's visit, please call [phone].

## 2017-06-08 ENCOUNTER — OFFICE VISIT (OUTPATIENT)
Dept: FAMILY MEDICINE CLINIC | Age: 47
End: 2017-06-08

## 2017-06-08 VITALS
DIASTOLIC BLOOD PRESSURE: 82 MMHG | SYSTOLIC BLOOD PRESSURE: 134 MMHG | BODY MASS INDEX: 29.53 KG/M2 | RESPIRATION RATE: 18 BRPM | TEMPERATURE: 98.4 F | HEART RATE: 72 BPM | WEIGHT: 173 LBS | HEIGHT: 64 IN | OXYGEN SATURATION: 98 %

## 2017-06-08 DIAGNOSIS — L02.91 ABSCESS: Primary | ICD-10-CM

## 2017-06-08 NOTE — PROGRESS NOTES
Chief Complaint   Patient presents with    Wound Check     check wound under left breast     she is a 41y.o. year old female who presents for evalution. She is here for follow-up wound care of a left breast abscess that had I & D done 2 days ago. She returned yesterday and had the wound cleaned and repacked, and today is here for another follow-up. Her pain level is a 1/10, and the wound has not drained through the dressing which was kept dry and intact. Reviewed PmHx, RxHx, FmHx, SocHx, AllgHx and updated and dated in the chart. Review of Systems - negative except as listed above in the HPI    Objective:     Vitals:    06/08/17 1510   BP: 134/82   Pulse: 72   Resp: 18   Temp: 98.4 °F (36.9 °C)   TempSrc: Oral   SpO2: 98%   Weight: 173 lb (78.5 kg)   Height: 5' 4\" (1.626 m)       Current Outpatient Prescriptions   Medication Sig    doxycycline (MONODOX) 100 mg capsule Take 1 Cap by mouth two (2) times a day.  fluconazole (DIFLUCAN) 100 mg tablet Take one tab today, then every other day as needed for vaginal yeast infection symptoms.  albuterol (PROVENTIL HFA, VENTOLIN HFA, PROAIR HFA) 90 mcg/actuation inhaler Take 2 Puffs by inhalation every six (6) hours as needed.  norethindrone-ethinyl estradiol (JUNEL FE 1/20) 1 mg-20 mcg (21)/75 mg (7) per tablet Take 1 Tab by mouth daily.  montelukast (SINGULAIR) 10 mg tablet Take 10 mg by mouth daily.  loratadine (CLARITIN) 10 mg tablet Take 10 mg by mouth daily. No current facility-administered medications for this visit.         Physical Examination: General appearance - alert, well appearing, and in no distress  Chest - clear to auscultation, no wheezes, rales or rhonchi, symmetric air entry  Heart - normal rate, regular rhythm, normal S1, S2, no murmurs, rubs, clicks or gallops  Abdomen - soft, nontender, nondistended, no masses or organomegaly  Skin - the left breast abscess is healing well with a small central hole 0.2 cm and a depth of 0.2cm and erythema surrounding [id number]. The packing and wick was intact, it has a scant amount of tan drainage on it. The area was irrigated well with Normal saline, antibiotic ointment was applied, and a small dressing. No wick or packing was needed. I have reviewed instructions for wound care, and she verbalized her understanding. She will continue the doxycycline for about 5 more days, and will return if needed. Assessment/ Plan:   Haja Wolf was seen today for wound check. Diagnoses and all orders for this visit:    Abscess     Follow-up Disposition:  Return if symptoms worsen or fail to improve. I have discussed the diagnosis with the patient and the intended plan as seen in the above orders. The patient has received an after-visit summary and questions were answered concerning future plans. Pt conveyed understanding of plan.     Medication Side Effects and Warnings were discussed with patient      Leidy Wheeler NP

## 2017-06-08 NOTE — PATIENT INSTRUCTIONS

## 2017-06-10 ENCOUNTER — TELEPHONE (OUTPATIENT)
Dept: PRIMARY CARE CLINIC | Age: 47
End: 2017-06-10

## 2017-06-10 LAB — BACTERIA SPEC AEROBE CULT: NORMAL

## 2017-07-14 ENCOUNTER — OFFICE VISIT (OUTPATIENT)
Dept: PRIMARY CARE CLINIC | Age: 47
End: 2017-07-14

## 2017-07-14 VITALS
OXYGEN SATURATION: 97 % | HEART RATE: 80 BPM | RESPIRATION RATE: 16 BRPM | BODY MASS INDEX: 29.6 KG/M2 | SYSTOLIC BLOOD PRESSURE: 116 MMHG | WEIGHT: 173.4 LBS | DIASTOLIC BLOOD PRESSURE: 79 MMHG | TEMPERATURE: 98.3 F | HEIGHT: 64 IN

## 2017-07-14 DIAGNOSIS — L02.91 ABSCESS: Primary | ICD-10-CM

## 2017-07-14 NOTE — PATIENT INSTRUCTIONS

## 2017-07-14 NOTE — PROGRESS NOTES
Chief Complaint   Patient presents with    Follow-up     Follow up from May 30 for an abcess     she is a 41y.o. year old female who presents for evalution. She is here to follow up on a left breast abscess that was treated with I & D and doxycycline. It is now a dry scab. It has no pain or discomfort, no swelling or redness. Reviewed PmHx, RxHx, FmHx, SocHx, AllgHx and updated and dated in the chart. Review of Systems - negative except as listed above in the HPI    Objective:     Vitals:    07/14/17 0805   BP: 116/79   Pulse: 80   Resp: 16   Temp: 98.3 °F (36.8 °C)   TempSrc: Oral   SpO2: 97%   Weight: 173 lb 6.4 oz (78.7 kg)   Height: 5' 4\" (1.626 m)       Current Outpatient Prescriptions   Medication Sig    albuterol (PROVENTIL HFA, VENTOLIN HFA, PROAIR HFA) 90 mcg/actuation inhaler Take 2 Puffs by inhalation every six (6) hours as needed.  norethindrone-ethinyl estradiol (JUNEL FE 1/20) 1 mg-20 mcg (21)/75 mg (7) per tablet Take 1 Tab by mouth daily.  montelukast (SINGULAIR) 10 mg tablet Take 10 mg by mouth daily.  loratadine (CLARITIN) 10 mg tablet Take 10 mg by mouth daily. No current facility-administered medications for this visit. Physical Examination: General appearance - alert, well appearing, and in no distress  Chest - clear to auscultation, no wheezes, rales or rhonchi, symmetric air entry  Heart - normal rate, regular rhythm, normal S1, S2, no murmurs, rubs, clicks or gallops  Abdomen - soft, nontender, nondistended, no masses or organomegaly  Breasts - breasts appear normal, no suspicious masses, no skin or nipple changes or axillary nodes. Left breast abscess healed, now with a 1 mm dry scab, no redness, swelling, warmth or tenderness. Extremities - peripheral pulses normal, no pedal edema, no clubbing or cyanosis      Assessment/ Plan:   Martine Doan was seen today for follow-up.     Diagnoses and all orders for this visit:    Abscess     Continue to observe for any new problems. See a surgeon if the abscess returns. Follow-up Disposition:  Return if symptoms worsen or fail to improve. I have discussed the diagnosis with the patient and the intended plan as seen in the above orders. The patient has received an after-visit summary and questions were answered concerning future plans. Pt conveyed understanding of plan.     Medication Side Effects and Warnings were discussed with patient      Bety Nielson NP

## 2017-10-26 ENCOUNTER — HOSPITAL ENCOUNTER (OUTPATIENT)
Dept: PHYSICAL THERAPY | Age: 47
Discharge: HOME | End: 2017-10-26
Payer: COMMERCIAL

## 2017-10-26 PROCEDURE — 97162 PT EVAL MOD COMPLEX 30 MIN: CPT

## 2017-10-26 PROCEDURE — 97110 THERAPEUTIC EXERCISES: CPT

## 2017-10-26 NOTE — PROGRESS NOTES
[employer] [facility]  [address] [facility], [place], [facility] [address] ([facility]), [address] [place]  Phone: [phone] Fax: [phone]    Plan of Care/Statement of Necessity for Physical Therapy Services  2-15    Patient name: Montse Wolfe  : 1970  Provider#: [id number]  Referral source: Dequan Bell MD      Medical/Treatment Diagnosis: Lower back pain [M54.5]  Hip pain [M25.559]     Prior Hospitalization: see medical history     Comorbidities: previous traumatic musculoskeletal hip injury, arthritis  Prior Level of Function: 20 minutes of exercise at least 3 times a week  Medications: Verified on Patient Summary List    Start of Care: 10/27/2017      Onset Date: chronic (>18 months ago)        The Plan of Care and following information is based on the information from the initial evaluation. Assessment/ key information: Patient presents to our clinic with left sided low back, buttock, and hip pain that started about a year and half ago. The patient reports a traumatic musculoskeletal hip injury 12 years ago to her left hip that was described as \"a muscle popped off\". The patient presents with decreased hip flexor strength, limited range of motion with hip flexion and hip IR due to pain that may indicate hip flexor and or SIJ irritation. Patient also presented with right anterior innominate rotation dysfunction and other signs and symptoms consistent with sacroiliac dysfunction or pelvic floor dysfunction. The patient also has limited range of motion and pain with lumbar spine extension and lateral flexion to the left as well as localized pain at L5 facet joint that could be indicative of facet joint dysfunction. The patient has had recent imaging that showed no skeletal abnormalities and was prescribes anti-inflammatory meds that she reports have only gotten mild relief.  She has also sought chiropractic and massage therapy services for current issue and only has mild relief from these interventions. The patient would benefit from therapy to continue to assess and treat current impairments and address global low back/buttock and hip pain and her activities limitations like sitting longer then 10 minutes, driving and sleeping. Evaluation Complexity History MEDIUM  Complexity : 1-2 comorbidities / personal factors will impact the outcome/ POC ; Examination MEDIUM Complexity : 3 Standardized tests and measures addressing body structure, function, activity limitation and / or participation in recreation  ;Presentation MEDIUM Complexity : Evolving with changing characteristics  ; Clinical Decision Making MEDIUM Complexity : FOTO score of 26-74  Overall Complexity Rating: MEDIUM    Problem List: pain affecting function, decrease ROM, decrease strength, edema affecting function, impaired gait/ balance, decrease ADL/ functional abilitiies, decrease activity tolerance, decrease flexibility/ joint mobility and decrease transfer abilities   Treatment Plan may include any combination of the following: Therapeutic exercise, Therapeutic activities, Neuromuscular re-education, Physical agent/modality, Gait/balance training, Manual therapy, Patient education, Self Care training and Functional mobility training  Patient / Family readiness to learn indicated by: asking questions, trying to perform skills and interest  Persons(s) to be included in education: patient (P)  Barriers to Learning/Limitations: None  Patient Goal (s): to be able to sit, sleep and return to my normal activities  Patient Self Reported Health Status: good  Rehabilitation Potential: good    Short Term Goals: To be accomplished in 4 treatments:   Patient will be able to sit for 10 minutes with less then 3/10 pain in 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 Long Term Goals:  To be accomplished in 16 treatments:   Patient will be able to sit for 30+ minutes with less then 2/10 pain in order to drive her kids around in the afternoon with less pain. Patient will be able to improve FOTO score to >/= 69 in order to be able to perform usual school work and household activities. Frequency / Duration: Patient to be seen 2 times per week for 16 treatments. Patient/ Caregiver education and instruction: self care, activity modification, exercises and other pain management strategies. [x]  Plan of care has been reviewed with SAROJ Deleon 10/26/2017 4:03 PM    ________________________________________________________________________    I certify that the above Therapy Services are being furnished while the patient is under my care. I agree with the treatment plan and certify that this therapy is necessary.     [de-identified] Signature:____________________  Date:____________Time: _________

## 2017-10-26 NOTE — PROGRESS NOTES
PT INITIAL EVALUATION NOTE 2-15    Patient Name: Norberto Riggs  Date:10/26/2017  : 1970  [x]  Patient  Verified  Payor: BLUE CROSS / Plan: [facility] PPO / Product Type: PPO /    In time:2:20pm  Out time:3:40pm   Total Treatment Time (min): 80  Visit #: 1     Treatment Area: Neck pain [M54.2]  Lower back pain [M54.5]    SUBJECTIVE  Pain Level (0-10 scale): 5  Any medication changes, allergies to medications, adverse drug reactions, diagnosis change, or new procedure performed?: [] No    [x] Yes (see summary sheet for update)  Subjective: Patient presents with left low back, buttock and hip pain for the past 1 year that has progressively gotten worse. She went to go see the orthopedic doctor and no fractures or skeletal abnormalities were found and was prescribed anti inflammatories that have helped easy her pain a little . She does report a right hip injury 12 years ago where \"a muscle popped off my hip\" however states full recovery from this incident.      OBJECTIVE/EXAMINATION  Description of symptoms: general hip tightness, localized pain in the left low back and SIJ  Aggravating Factors: prolonged hip flexion, sleeping, sitting (10 minutes)  Alleviating Factors: anti-inflammatory, standing > sitting,     Radiation: none    Patient reports functional limitations with: sleeping, sitting for prolonged periods of time, driving    OBJECTIVE    Posture:  Good sitting posture  Other Observations:  Overweight, with slight forward head posture   Gait and Functional Mobility:  Limited hip extension, mild trendelenburg gait, right lateral weight shift with squats and heel raise on left foot  Palpation: left lateral L QL, L5 transverse process         Lumbar AROM:          R   L    Flexion    Finger tips to toes      Extension   WNL (p!)     Side Bending   Joint line (>L)  Joint line (p!)    Rotation   WNL   WNL        LOWER QUARTER   MUSCLE STRENGTH  KEY       R  L  0 - No Contraction  L1, L2 Psoas  5  4  1 - Trace   L3 Quads  NT  NT  2 - Poor   L4 Tib Ant  NT  NT  3 - Fair    L5 EHL  NT  NT   4 - Good   S1 Peroneals  NT  NT  5 - Normal   S2 Hams  NT  NT    Flexibility: moderately flexible, left hip flexor hypomobile compared to right  Mobility Assessment: hypomobile L5 PA mob       MMT:               HIP Ext: NT              HIP Abd: 4/5  Special Tests:    Scours: -    Sacral Float: + for decreased pain   Slump: NT   SLR: -          Long Sit: +        SI Compression/Distraction: +    25 min Therapeutic Exercise:  [x] See flow sheet :   Rationale: increase ROM, increase strength, improve coordination, improve balance and increase proprioception to improve the patients ability to perform daily activities.           With   [x] TE   [] TA   [] neuro   [] other: Patient Education: [x] Review HEP    [x] Progressed/Changed HEP based on:   [x] positioning   [x] body mechanics   [] transfers   [] heat/ice application    [] other:        Other Objective/Functional Measures: FOTO= 57; R heel raise= 15 decreased heights with no sway deviations , L standing heel raise= 10 with decreased heel raise and > 5 sway deviations     Pain Level (0-10 scale) post treatment: 0      ASSESSMENT:      [x]  See Plan of Care      Eloy Deleon 10/26/2017  2:36 PM

## 2017-10-30 ENCOUNTER — HOSPITAL ENCOUNTER (OUTPATIENT)
Dept: PHYSICAL THERAPY | Age: 47
Discharge: HOME | End: 2017-10-30
Payer: COMMERCIAL

## 2017-10-30 PROCEDURE — 97140 MANUAL THERAPY 1/> REGIONS: CPT

## 2017-10-30 PROCEDURE — 97110 THERAPEUTIC EXERCISES: CPT

## 2017-10-30 NOTE — PROGRESS NOTES
PT DAILY TREATMENT NOTE 2-15    Patient Name: Finesse Lopez  Date:10/30/2017  : 1970  [x]  Patient  Verified  Payor: BLUE CROSS / Plan: [facility] [id number] PPO / Product Type: PPO /    In time: 5:05pm  Out time:6:10 pm  Total Treatment Time (min): 65   Visit #: 2     Treatment Area: Lower back pain [M54.5]  Hip pain [M25.559]    SUBJECTIVE  Pain Level (0-10 scale): 4  Any medication changes, allergies to medications, adverse drug reactions, diagnosis change, or new procedure performed?: [x] No    [] Yes (see summary sheet for update)  Subjective functionatl status/changes:   [] No changes reported  I've been doing my exercises and I can really feel it in my \"glut butt muscles\"    OBJECTIVE    Modality rationale: decrease pain and increase tissue extensibility to improve the patients ability to perform daily activities and reduce muscle spasm.    Min Type Additional Details    [] Estim: []Att   []Unatt        []TENS instruct                  []IFC  []Premod   []NMES                     []Other:  []w/US   []w/ice   []w/heat  Position:  Location:    []  Traction: [] Cervical       []Lumbar                       [] Prone          []Supine                       []Intermittent   []Continuous Lbs:  [] before manual  [] after manual  []w/heat    []  Ultrasound: []Continuous   [] Pulsed at:                            []1MHz   []3MHz Location:  W/cm2:    []  Paraffin         Location:  []w/heat   10 []  Ice     [x]  Heat  []  Ice massage Position:hooklying   Location: low back/anterior hip    []  Laser  []  Other: Position:  Location:    []  Vasopneumatic Device Pressure:       [] lo [] med [] hi   Temperature:    [x] Skin assessment post-treatment:  [x]intact []redness- no adverse reaction    []redness  adverse reaction:     40 min Therapeutic Exercise:  [x] See flow sheet :   Rationale: increase ROM, increase strength, improve coordination, improve balance and increase proprioception to improve the patients ability to perform daily activities and return to previous level of exercise routine. 15 min Manual Therapy:  Soft tissue and myofascial work to left piriformis, grade III L5 UPA    Rationale: decrease pain, increase ROM, increase tissue extensibility and decrease trigger points  to improve the patients ability to participate in PT treatment and address mechanical dysfunction in lumbar and sacral region. With   [x] TE   [] TA   [] neuro   [] other: Patient Education: [x] Review HEP    [x] Progressed/Changed HEP based on:   [x] positioning   [x] body mechanics   [] transfers   [] heat/ice application    [] other: positioning at work      Other Objective/Functional Measures: right anterior innominate sacral dysfunction, hypomobile L5 UPA, bilateral piriformis restriction R>L with multiple trigger points     Pain Level (0-10 scale) post treatment: 3    ASSESSMENT/Changes in Function:     Patient reports decreased symptoms today however continues to have left sided low back and hip pain. She reports compliance with HEP, although she was performing her MET for right anterior innominate incorrectly. The patient presented again today with right anterior innominate mechanical dysfunction that decreased symptoms and presentation after manual work and MET. The patient demonstrates soft tissue restrictions in both hip flexor and piriformis bilaterally L>R and with L5 hypomobile segment with UPA that recreates concurrent symptoms. Patient will continue to benefit from skilled PT services to modify and progress therapeutic interventions, address functional mobility deficits, address ROM deficits, address strength deficits, analyze and address soft tissue restrictions, analyze and cue movement patterns, analyze and modify body mechanics/ergonomics, assess and modify postural abnormalities and instruct in home and community integration to attain remaining goals.      [x]  See Plan of Care  []  See progress note/recertification  []  See Discharge Summary         Progress towards goals / Updated goals:  Patient is progressing towards goals.     PLAN  [x]  Upgrade activities as tolerated     [x]  Continue plan of care  [x]  Update interventions per flow sheet       []  Discharge due to:_  []  Other:_      Dario Gong 10/30/2017  5:05 PM

## 2017-11-01 ENCOUNTER — HOSPITAL ENCOUNTER (OUTPATIENT)
Dept: PHYSICAL THERAPY | Age: 47
Discharge: HOME | End: 2017-11-01
Payer: COMMERCIAL

## 2017-11-01 PROCEDURE — 97110 THERAPEUTIC EXERCISES: CPT | Performed by: PHYSICAL THERAPIST

## 2017-11-01 PROCEDURE — 97140 MANUAL THERAPY 1/> REGIONS: CPT | Performed by: PHYSICAL THERAPIST

## 2017-11-01 NOTE — PROGRESS NOTES
PT DAILY TREATMENT NOTE 2-15    Patient Name: Ann Matthew  Date:2017  : 1970  [x]  Patient  Verified  Payor: BLUE CROSS / Plan: [facility] PPO / Product Type: PPO /    In time: 4:53pm  Out time: 5:48pm  Total Treatment Time (min): 55  Visit #: 3     Treatment Area: Lower back pain [M54.5]  Hip pain [M25.559]    SUBJECTIVE  Pain Level (0-10 scale): 5  Any medication changes, allergies to medications, adverse drug reactions, diagnosis change, or new procedure performed?: [x] No    [] Yes (see summary sheet for update)  Subjective functional status/changes:   [] No changes reported  \" I feel pretty good today. I was a little sore yesterday however I was wearing different shoes and I was extremely busy. \"    OBJECTIVE    Modality rationale: decrease pain and increase tissue extensibility to improve the patients ability to perform daily activities with less pain.     Min Type Additional Details    [] Estim: []Att   []Unatt        []TENS instruct                  []IFC  []Premod   []NMES                     []Other:  []w/US   []w/ice   []w/heat  Position:  Location:    []  Traction: [] Cervical       []Lumbar                       [] Prone          []Supine                       []Intermittent   []Continuous Lbs:  [] before manual  [] after manual  []w/heat    []  Ultrasound: []Continuous   [] Pulsed at:                            []1MHz   []3MHz Location:  W/cm2:    []  Paraffin         Location:  []w/heat   10 []  Ice     [x]  Heat  []  Ice massage Position: hooklying supine  Location: lumbar spine and left anterior hip flexor    []  Laser  []  Other: Position:  Location:    []  Vasopneumatic Device Pressure:       [] lo [] med [] hi   Temperature:    [x] Skin assessment post-treatment:  [x]intact []redness- no adverse reaction    []redness  adverse reaction:     30 min Therapeutic Exercise:  [x] See flow sheet :   Rationale: increase strength, improve coordination, improve balance and increase proprioception to improve the patients ability to improve lumbopelvic stability and be able to perform daily activities with less pain    15 min Manual Therapy: soft tissue to piriformis (R>L), L L5 UPA, anterior hip mobs (R>L), posterior talocrural mobs   Rationale: decrease pain, increase ROM, increase tissue extensibility and decrease trigger points  to improve the patients ability to improve on functional mobility with less pain. With   [x] TE   [] TA   [] neuro   [] other: Patient Education: [x] Review HEP    [x] Progressed/Changed HEP based on:   [x] positioning   [x] body mechanics   [] transfers   [] heat/ice application    [x] other: patient was educated on proper shoes to wear to prevent increased stress on her left side     Other Objective/Functional Measures: L L5 hypomobile UPA      Pain Level (0-10 scale) post treatment: 4    ASSESSMENT/Changes in Function:     The patient continued to present with right anterior innominate however initial presentation of dysfunction improved from previous visit. The patient demonstrates poor lumbopelvic and hip stability L>R and requires cueing to avoid compensatory patterns. The patient tolerated progression of hip stability and posterior hip strengthening exercises today however fatigues quickly. Patient will continue to benefit from skilled PT services to modify and progress therapeutic interventions, address functional mobility deficits, address ROM deficits, address strength deficits, analyze and address soft tissue restrictions, analyze and cue movement patterns, analyze and modify body mechanics/ergonomics, assess and modify postural abnormalities and instruct in home and community integration to attain remaining goals and be able to return to prior level of functioning and exercise routine.       [x]  See Plan of Care  []  See progress note/recertification  []  See Discharge Summary         Progress towards goals / Updated goals:  Patient is progressing towards goals.      PLAN  [x]  Upgrade activities as tolerated     [x]  Continue plan of care  [x]  Update interventions per flow sheet       []  Discharge due to:_  []  Other:_      Eloy Deleon 11/1/2017  4:52 PM

## 2017-11-06 ENCOUNTER — HOSPITAL ENCOUNTER (OUTPATIENT)
Dept: PHYSICAL THERAPY | Age: 47
End: 2017-11-06
Payer: COMMERCIAL

## 2017-11-08 ENCOUNTER — HOSPITAL ENCOUNTER (OUTPATIENT)
Dept: PHYSICAL THERAPY | Age: 47
Discharge: HOME | End: 2017-11-08
Payer: COMMERCIAL

## 2017-11-08 PROCEDURE — 97110 THERAPEUTIC EXERCISES: CPT

## 2017-11-08 PROCEDURE — 97140 MANUAL THERAPY 1/> REGIONS: CPT

## 2017-11-08 NOTE — PROGRESS NOTES
PT DAILY TREATMENT NOTE 2-15    Patient Name: Negrita Fitzpatrick  Date:2017  : 1970  [x]  Patient  Verified  Payor: BLUE CROSS / Plan: [facility] PPO / Product Type: PPO /    In time:5:00pm  Out time:5:55 pm  Total Treatment Time (min): 55  Visit #: 4     Treatment Area: Lower back pain [M54.5]  Hip pain [M25.559]    SUBJECTIVE  Pain Level (0-10 scale): 4  Any medication changes, allergies to medications, adverse drug reactions, diagnosis change, or new procedure performed?: [x] No    [] Yes (see summary sheet for update)  Subjective functional status/changes:   [] No changes reported  The patient states she had been feeling really good over the weekend not having any pain and getting some good sleep. She said over the weekend she did do a lot of walking doing a campus tour with her daughter and felt pretty bad on Monday but overall she notices she hasn't been rubbing her low back as much.      OBJECTIVE    Modality rationale: decrease pain and increase tissue extensibility to improve the patients ability to perform daily activities    Min Type Additional Details    [] Estim: []Att   []Unatt        []TENS instruct                  []IFC  []Premod   []NMES                     []Other:  []w/US   []w/ice   []w/heat  Position:  Location:    []  Traction: [] Cervical       []Lumbar                       [] Prone          []Supine                       []Intermittent   []Continuous Lbs:  [] before manual  [] after manual  []w/heat    []  Ultrasound: []Continuous   [] Pulsed at:                            []1MHz   []3MHz Location:  W/cm2:    []  Paraffin         Location:  []w/heat   10 []  Ice     [x]  Heat  []  Ice massage Position:hookinglying supine Location: low back     []  Laser  []  Other: Position:  Location:    []  Vasopneumatic Device Pressure:       [] lo [] med [] hi   Temperature:    [x] Skin assessment post-treatment:  [x]intact []redness- no adverse reaction    []redness  adverse reaction:     35 min Therapeutic Exercise:  [x] See flow sheet :   Rationale: increase ROM, increase strength, improve coordination, improve balance and increase proprioception to improve the patients ability to perform daily activities and get back to exercise routine. 10 min Manual Therapy:  MET to correct sacral dysfunction of R anterior innominate   Rationale: increase postural awareness and correct mechanical dysfunction   to improve the patients ability to improve functional mobility with less pain. With   [x] TE   [] TA   [] neuro   [] other: Patient Education: [x] Review HEP    [x] Progressed/Changed HEP based on:   [x] positioning   [x] body mechanics   [] transfers   [] heat/ice application    [x] other: patient was educated on heel lift to correct length of left leg to correct discrepancy and how to integrate the heel lift day to day, patient was educated about her clinical presentation and the correction we were making to address her left sided low back pain and hip pain      Other Objective/Functional Measures: SLS R= 25 L=24 seconds  Post heel lift correcting      Pain Level (0-10 scale) post treatment: 3    ASSESSMENT/Changes in Function:     The patient is progressing very well and complaint with HEP and has had decreased episodes of pain over the past week, however did have a flare up after walking a lot this past weekend. The patient presented with similar right anterior innominate rotation that improved presentation after MET however right leg due to heel puffiness is noticeable longer then the left leg. The patient was given a heel lift for the left leg to counter the imbalance between the two legs and educated on how to properly integrate them into he daily activities.  Patient will continue to benefit from skilled PT services to modify and progress therapeutic interventions, address functional mobility deficits, address ROM deficits, address strength deficits, analyze and address soft tissue restrictions, analyze and cue movement patterns, analyze and modify body mechanics/ergonomics, assess and modify postural abnormalities and instruct in home and community integration to attain remaining goals. [x]  See Plan of Care  []  See progress note/recertification  []  See Discharge Summary         Progress towards goals / Updated goals: The patient is progressing towards goals.      PLAN  []  Upgrade activities as tolerated     []  Continue plan of care  []  Update interventions per flow sheet       []  Discharge due to:_  []  Other:_      Lizett Massing 11/8/2017  6:08 PM

## 2017-11-13 ENCOUNTER — HOSPITAL ENCOUNTER (OUTPATIENT)
Dept: PHYSICAL THERAPY | Age: 47
Discharge: HOME | End: 2017-11-13
Payer: COMMERCIAL

## 2017-11-13 PROCEDURE — 97110 THERAPEUTIC EXERCISES: CPT

## 2017-11-13 PROCEDURE — 97140 MANUAL THERAPY 1/> REGIONS: CPT

## 2017-11-13 NOTE — PROGRESS NOTES
PT DAILY TREATMENT NOTE 2-15    Patient Name: Erika Ware  Date:2017  : 1970  [x]  Patient  Verified  Payor: BLUE CROSS / Plan: [facility] [id number] PPO / Product Type: PPO /    In time:4:40pm  Out time:5:45pm  Total Treatment Time (min): 65  Visit #: 5     Treatment Area: Lower back pain [M54.5]  Hip pain [M25.559]    SUBJECTIVE  Pain Level (0-10 scale): 3  Any medication changes, allergies to medications, adverse drug reactions, diagnosis change, or new procedure performed?: [x] No    [] Yes (see summary sheet for update)  Subjective functional status/changes:   [] No changes reported  The patient reports that she has some relief from the heel lift however did have pain with it on  while she was at [employer] and had to take it out. OBJECTIVE    Modality rationale: decrease pain and increase tissue extensibility to improve the patients ability to perform daily activities.    Min Type Additional Details    [] Estim: []Att   []Unatt        []TENS instruct                  []IFC  []Premod   []NMES                     []Other:  []w/US   []w/ice   []w/heat  Position:  Location:    []  Traction: [] Cervical       []Lumbar                       [] Prone          []Supine                       []Intermittent   []Continuous Lbs:  [] before manual  [] after manual  []w/heat    []  Ultrasound: []Continuous   [] Pulsed at:                            []1MHz   []3MHz Location:  W/cm2:    []  Paraffin         Location:  []w/heat   10 []  Ice     [x]  Heat  []  Ice massage Position: supine  Location: low back and L hip flexor    []  Laser  []  Other: Position:  Location:    []  Vasopneumatic Device Pressure:       [] lo [] med [] hi   Temperature:    [x] Skin assessment post-treatment:  [x]intact []redness- no adverse reaction    []redness  adverse reaction:     30 min Therapeutic Exercise:  [x] See flow sheet :   Rationale: increase ROM, increase strength, improve coordination, improve balance and increase proprioception to improve the patients ability to perform daily activities       25 min Manual Therapy:    Soft tissue: L hip flexor tendon and psoas, piriformis (L>R), L QL   Contract Relax: L QL, L Hip IR   Mobilization: R posterior talocrural, L lateral hip    Rationale: decrease pain, increase ROM, increase tissue extensibility and decrease trigger points  to improve the patients ability to perform daily activities. With   [x] TE   [] TA   [] neuro   [] other: Patient Education: [x] Review HEP    [x] Progressed/Changed HEP based on:   [x] positioning   [x] body mechanics   [x] transfers   [x] heat/ice application    [x] other: patient educated on heel lift usage and monitor her LBP     Other Objective/Functional Measures: Patient weight shifts to the right with squats after 45 deg of knee flexion, patient was able     Pain Level (0-10 scale) post treatment: 3    ASSESSMENT/Changes in Function:     The patient reports that her left low back pain has improved immensely and feels as though her left hip just feels tight, especially in the front. The patient does have increased tension in the hip flexor, QL, and piriformis L>R. The patient responded well to manual work and after hip mobilizations had improved initial presentation of right anterior innominate at the beginning of the visit. The patient continues to progress glute and lumbopelvic strengthening exercises. The patient was encouraged to continue with the heel lift as tolerated as right heel continues to create an asymmetric leg length difference.  Patient will continue to benefit from skilled PT services to modify and progress therapeutic interventions, address functional mobility deficits, address ROM deficits, address strength deficits, analyze and address soft tissue restrictions, analyze and cue movement patterns, analyze and modify body mechanics/ergonomics, assess and modify postural abnormalities and instruct in home and community integration to attain remaining goals. [x]  See Plan of Care  []  See progress note/recertification  []  See Discharge Summary         Progress towards goals / Updated goals: The patient is progressing towards goals.      PLAN  [x]  Upgrade activities as tolerated     [x]  Continue plan of care  [x]  Update interventions per flow sheet       []  Discharge due to:_  []  Other:_      Angy Elizabeth 11/13/2017  5:39 PM

## 2017-11-15 ENCOUNTER — APPOINTMENT (OUTPATIENT)
Dept: PHYSICAL THERAPY | Age: 47
End: 2017-11-15
Payer: COMMERCIAL

## 2017-11-20 ENCOUNTER — HOSPITAL ENCOUNTER (OUTPATIENT)
Dept: PHYSICAL THERAPY | Age: 47
Discharge: HOME | End: 2017-11-20
Payer: COMMERCIAL

## 2017-11-20 PROCEDURE — 97140 MANUAL THERAPY 1/> REGIONS: CPT

## 2017-11-20 PROCEDURE — 97110 THERAPEUTIC EXERCISES: CPT

## 2017-11-22 ENCOUNTER — HOSPITAL ENCOUNTER (OUTPATIENT)
Dept: PHYSICAL THERAPY | Age: 47
Discharge: HOME | End: 2017-11-22
Payer: COMMERCIAL

## 2017-11-22 PROCEDURE — 97110 THERAPEUTIC EXERCISES: CPT

## 2017-11-22 PROCEDURE — 97140 MANUAL THERAPY 1/> REGIONS: CPT

## 2017-11-22 NOTE — PROGRESS NOTES
PT DAILY TREATMENT NOTE 2-15    Patient Name: Getachew Augustine  Date:2017  : 1970  [x]  Patient  Verified  Payor: BLUE CROSS / Plan: [facility] PPO / Product Type: PPO /    In time:9:05am  Out time:10:03  Total Treatment Time (min): 62  Visit #: 7     Treatment Area: Lower back pain [M54.5]  Hip pain [M25.559]    SUBJECTIVE  Pain Level (0-10 scale): 4  Any medication changes, allergies to medications, adverse drug reactions, diagnosis change, or new procedure performed?: [x] No    [] Yes (see summary sheet for update)  Subjective functional status/changes:   [] No changes reported  The patient states she has soreness in the front of her hip     OBJECTIVE    Modality rationale: decrease pain and increase tissue extensibility to improve the patients ability to perform daily activities   Min Type Additional Details    [] Estim: []Att   []Unatt        []TENS instruct                  []IFC  []Premod   []NMES                     []Other:  []w/US   []w/ice   []w/heat  Position:  Location:    []  Traction: [] Cervical       []Lumbar                       [] Prone          []Supine                       []Intermittent   []Continuous Lbs:  [] before manual  [] after manual  []w/heat    []  Ultrasound: []Continuous   [] Pulsed at:                            []1MHz   []3MHz Location:  W/cm2:    []  Paraffin         Location:  []w/heat   10 []  Ice     [x]  Heat  []  Ice massage Position:supine hooklying  Location: lumbar/sacral region and left anterior hip    []  Laser  []  Other: Position:  Location:    []  Vasopneumatic Device Pressure:       [] lo [] med [] hi   Temperature:    [x] Skin assessment post-treatment:  [x]intact []redness- no adverse reaction    []redness  adverse reaction:     40 min Therapeutic Exercise:  [x] See flow sheet :   Rationale: increase ROM, increase strength, improve coordination, improve balance and increase proprioception to improve the patients ability to improve lumbopelvic stability to improved functional mobility with less symptoms in hip and SI joint. 8 min Manual Therapy: grade IV/V L upslip, grade I to II hip distraction, L anterior hip flexor soft tissue trigger point release and myofascial release   Rationale: decrease pain, increase ROM, increase tissue extensibility and decrease trigger points  to improve the patients ability to perform activities and improve tolerance to guided therapeutic exercises. With   [x] TE   [] TA   [] neuro   [] other: Patient Education: [x] Review HEP    [x] Progressed/Changed HEP based on:   [x] positioning   [x] body mechanics   [] transfers   [] heat/ice application    [] other:      Other Objective/Functional Measures: decreased stability with SL weightbearing exercises on left more than right     Pain Level (0-10 scale) post treatment: 1-2/10     ASSESSMENT/Changes in Function:     The patient reports soreness located at the left anterior hip and has increased muscular tension in the left hip flexor tendon and psoas muscles. The patient reports less localized sharp left SI joint pain but does have some muscle soreness in bilateral paraspinals. The patient has limited ability to engage core and maintain lumbar stabilization during dead bug without verbal and tactile cues to avoid excessive lumbar lordosis. The patient demonstrates improved stability and strength with glute and hamstring exercises and is encouraged to progress HEP as exercises get easier. The patient was also educated on positioning in car to decreased hip flexion angle to decrease impingement symptoms in left hip.  Patient will continue to benefit from skilled PT services to modify and progress therapeutic interventions, address functional mobility deficits, address ROM deficits, address strength deficits, analyze and address soft tissue restrictions, analyze and cue movement patterns, analyze and modify body mechanics/ergonomics, assess and modify postural abnormalities and instruct in home and community integration to attain remaining goals. [x]  See Plan of Care  []  See progress note/recertification  []  See Discharge Summary         Progress towards goals / Updated goals: The patient is progressing towards goals.     PLAN  [x]  Upgrade activities as tolerated     [x]  Continue plan of care  [x]  Update interventions per flow sheet       []  Discharge due to:_  []  Other:_      Eloy Deleon 11/22/2017  10:45 AM

## 2017-11-27 ENCOUNTER — HOSPITAL ENCOUNTER (OUTPATIENT)
Dept: PHYSICAL THERAPY | Age: 47
Discharge: HOME | End: 2017-11-27
Payer: COMMERCIAL

## 2017-11-27 PROCEDURE — 97112 NEUROMUSCULAR REEDUCATION: CPT

## 2017-11-27 PROCEDURE — 97110 THERAPEUTIC EXERCISES: CPT

## 2017-11-27 PROCEDURE — 97140 MANUAL THERAPY 1/> REGIONS: CPT

## 2017-11-27 NOTE — PROGRESS NOTES
PT DAILY TREATMENT NOTE 2-15    Patient Name: Smith Santana  Date:2017  : 1970  [x]  Patient  Verified  Payor: BLUE CROSS / Plan: [facility] BLUE [facility] PPO / Product Type: PPO /    In time:4:27pm  Out time:5:45pm  Total Treatment Time (min): 78  Visit #: 8     Treatment Area: Lower back pain [M54.5]  Hip pain [M25.559]    SUBJECTIVE  Pain Level (0-10 scale): 6-7/10  Any medication changes, allergies to medications, adverse drug reactions, diagnosis change, or new procedure performed?: [x] No    [] Yes (see summary sheet for update)  Subjective functional status/changes:   [] No changes reported  The patient states she was sitting a lot yesterday and had a bad nights sleep and now has some increased pain in the anterior part of her hip. OBJECTIVE    Modality rationale: decrease pain and increase tissue extensibility to improve the patients ability to perform daily activity.    Min Type Additional Details    [] Estim: []Att   []Unatt        []TENS instruct                  []IFC  []Premod   []NMES                     []Other:  []w/US   []w/ice   []w/heat  Position:  Location:    []  Traction: [] Cervical       []Lumbar                       [] Prone          []Supine                       []Intermittent   []Continuous Lbs:  [] before manual  [] after manual  []w/heat    []  Ultrasound: []Continuous   [] Pulsed at:                            []1MHz   []3MHz Location:  W/cm2:    []  Paraffin         Location:  []w/heat   10 []  Ice     [x]  Heat  []  Ice massage Position: reclined sitting   Location:lumbosacral region    []  Laser  []  Other: Position:  Location:    []  Vasopneumatic Device Pressure:       [] lo [] med [] hi   Temperature:    [x] Skin assessment post-treatment:  [x]intact []redness- no adverse reaction    []redness  adverse reaction:     48 min Therapeutic Exercise:  [x] See flow sheet :   Rationale: increase strength, improve coordination, improve balance and increase proprioception to improve the patients ability to perform daily activities and overall functional mobility. 10 min Manual Therapy: Left hip flexor release with PROM hip flexion and abd/IR/ER  [x]  See flow sheet :   Rationale: increase ROM and decrease trigger points  to improve the patients ability to participate in therapeutic exercise and perform daily activities. 10 min Neuromuscular Re-education:  [x]  See flow sheet : cueing and positioning during lunges, courtesy squats and TRX squats for proper form and muscle recruitment   Rationale: increase strength, improve coordination, improve balance and increase proprioception  to improve the patients functional mobility and perform daily activities. With   [x] TE   [] TA   [x] neuro   [] other: Patient Education: [x] Review HEP    [x] Progressed/Changed HEP based on:   [x] positioning   [] body mechanics   [] transfers   [] heat/ice application    [] other:      Other Objective/Functional Measures: left knee pain (trail leg) with forward lunges at end range flexion     Pain Level (0-10 scale) post treatment: 2    ASSESSMENT/Changes in Function:     The patient continues to demonstrates irritation and decreased extensibility in left hip flexor with mild discomfort sacroiliac joint a lumbar spine that seems to be exacerbated with prolonged sitting. The patient responded will to soft tissue and manual trigger point release to L hip flexors and was able to participate in all glute and core strengthening exercises without increased hip flexor discomfort on the left. The patient was shown how to perform self hip flexor release at home and was instructed to limit prolonged deep hip flexion sitting positions.  Patient will continue to benefit from skilled PT services to modify and progress therapeutic interventions, address functional mobility deficits, address ROM deficits, address strength deficits, analyze and address soft tissue restrictions, analyze and cue movement patterns, analyze and modify body mechanics/ergonomics, assess and modify postural abnormalities and instruct in home and community integration to attain remaining goals. [x]  See Plan of Care  []  See progress note/recertification  []  See Discharge Summary         Progress towards goals / Updated goals: The patient is progressing towards goals.      PLAN  [x]  Upgrade activities as tolerated     [x]  Continue plan of care  [x]  Update interventions per flow sheet       []  Discharge due to:_  []  Other:_      Eloy Deleon 11/27/2017  3:17 PM

## 2017-11-29 ENCOUNTER — HOSPITAL ENCOUNTER (OUTPATIENT)
Dept: PHYSICAL THERAPY | Age: 47
Discharge: HOME | End: 2017-11-29
Payer: COMMERCIAL

## 2017-11-29 PROCEDURE — 97110 THERAPEUTIC EXERCISES: CPT

## 2017-11-29 PROCEDURE — 97140 MANUAL THERAPY 1/> REGIONS: CPT

## 2017-11-29 NOTE — PROGRESS NOTES
[facility]  [facility] ([facility]), [address]  Phone: [phone] Fax: [phone]    Progress Note    Name: Jorge L Parker   : 1970   MD: Adrian Hernandez MD       Treatment Diagnosis: Lower back pain [M54.5]  Hip pain [M25.559]  Start of Care: 10/27/17    Visits from Start of Care: 9  Missed Visits: 0    Summary of Care: Therapy has included guided therapeutic interventions such as therex for strengthening and neuromuscular re-eduction, manual techniques for joint mobility and soft tissue extensibility, and modalities for pain management in order to improve functional mobility with daily activities    Assessment / Recommendations: The patient has been progressing well with therapy. The patient has overall less low back and buttock pain however continues to demonstrate pain and tightness in the anterior left hip. The patient has been extremely compliant with home exercise program and has demonstrated improved glute and lumbosacral strength that has alleviated frequency and complaints of sacroiliac issues. The patients current chief complaint is localized hip flexor tendon and iliacus pain that recreates her concurrent symptoms. She continues to report increased anterolateral hip pain with prolonged sitting however it is mostly limited to the car after 30+ minutes (onset at 10 minutes). The patient presents with improvements in overall functional mobility indicated by the increase in the functional outcome measure FOTO from initial evaluation (57) to 76  (6 points being the AQUILINO). The patient may benefit from imaging to rule out possible soft tissue derangement. The patient will benefit from continued physical therapy utilizing manual techniques and aggressive glute strengthening to address her hip pain.  If she continues to have pain and discomfort, the patient has shown interest in dry needling, which has been discussed but will be re-addressed after 4 more weeks of intensive therapy. 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 -MET (onset of symptoms only with 10 minutes of sitting in car, however pain doesn't reach above a 3 until 30+ minutes)                          Patient will be able to tolerate sleep without being woken up by the pain 3 nights in a row. -MET (4 consecutive nights)   Long Term Goals: To be accomplished in 16 treatments:                         Patient will be able to sit for 30+ minutes with less then 2/10 pain in order to drive her kids around in the afternoon with less pain. -PROGRESSING                          Patient will be able to improve FOTO score to >/= 69 in order to be able to perform usual school work and household activities. -MET    Other: continue with therapy 1-2x a week for 8 treatments       Eloy Deleon 11/29/2017 3:16 PM    ________________________________________________________________________  NOTE TO PHYSICIAN:  Please complete the following and fax to: [facility]: [phone]  . Retain this original for your records. If you are unable to process this request in 24 hours, please contact our office.        ____ I have read the above report and request that my patient continue therapy with the following changes/special instructions:  ____ I have read the above report and request that my patient be discharged from therapy    Physician's Signature:_________________ Date:___________Time:__________

## 2017-11-29 NOTE — PROGRESS NOTES
PT DAILY TREATMENT NOTE 2-15    Patient Name: Jorge L Parker  Date:2017  : 1970  [x]  Patient  Verified  Payor: BLUE CROSS / Plan: [id number] PPO / Product Type: PPO /    In time:4:30pm  Out time:5:30pm  Total Treatment Time (min): 60   Visit #: 9     Treatment Area: Lower back pain [M54.5]  Hip pain [M25.559]    SUBJECTIVE  Pain Level (0-10 scale): 1.5  Any medication changes, allergies to medications, adverse drug reactions, diagnosis change, or new procedure performed?: [x] No    [] Yes (see summary sheet for update)  Subjective functional status/changes:   [] No changes reported  The patient reports being extremely sore after last visit, especially in the left glute. OBJECTIVE    Modality rationale: decrease pain and increase tissue extensibility to improve the patients ability to perform daily activities.     Min Type Additional Details    [] Estim: []Att   []Unatt        []TENS instruct                  []IFC  []Premod   []NMES                     []Other:  []w/US   []w/ice   []w/heat  Position:  Location:    []  Traction: [] Cervical       []Lumbar                       [] Prone          []Supine                       []Intermittent   []Continuous Lbs:  [] before manual  [] after manual  []w/heat    []  Ultrasound: []Continuous   [] Pulsed at:                            []1MHz   []3MHz Location:  W/cm2:    []  Paraffin         Location:  []w/heat   10 []  Ice     [x]  Heat  []  Ice massage Position:long sitting with bolster  Location:low back left anterior hip    []  Laser  []  Other: Position:  Location:    []  Vasopneumatic Device Pressure:       [] lo [] med [] hi   Temperature:    [x] Skin assessment post-treatment:  [x]intact []redness- no adverse reaction    []redness  adverse reaction:     25 min Therapeutic Exercise:  [x] See flow sheet :   Rationale: increase ROM, increase strength, improve balance and increase proprioception to improve the patients ability to perform daily activities. 25 min Manual Therapy: trigger point release and manual stretching to psoas major, iliopsoas tendon and iliacus   Rationale: decrease pain, increase ROM, increase tissue extensibility and decrease trigger points  to improve the patients ability to perform daily activities. With   [x] TE   [] TA   [] neuro   [] other: Patient Education: [x] Review HEP    [x] Progressed/Changed HEP based on:   [x] positioning   [x] body mechanics   [] transfers   [] heat/ice application    [] other:     Other Objective/Functional Measures: patient did not demonstrates any knee pain with split lunges,   FOTO: 76 (inital evaluation 57)      Pain Level (0-10 scale) post treatment: 0    ASSESSMENT/Changes in Function:     The patient reports lowest pain level since initial evaluation today. The patient reported bilateral glute soreness after previous visit L>R. The patient was fatigued in hip abductors and glutes requiring more breaks to complete LE strengthening exercises with proper form. The patient will continue to benefit from skilled PT services to modify and progress therapeutic interventions, address functional mobility deficits, address ROM deficits, address strength deficits, analyze and address soft tissue restrictions, analyze and cue movement patterns, analyze and modify body mechanics/ergonomics, assess and modify postural abnormalities and instruct in home and community integration to attain remaining goals. [x]  See Plan of Care  [x]  See progress note/recertification  []  See Discharge Summary         Progress towards goals / Updated goals: The patient is progressing towards goals.      PLAN  [x]  Upgrade activities as tolerated     [x]  Continue plan of care  [x]  Update interventions per flow sheet       []  Discharge due to:_  []  Other:_      Kartik Stevens 11/29/2017  6:37 PM

## 2017-12-11 ENCOUNTER — HOSPITAL ENCOUNTER (OUTPATIENT)
Dept: PHYSICAL THERAPY | Age: 47
Discharge: HOME | End: 2017-12-11
Payer: COMMERCIAL

## 2017-12-11 PROCEDURE — 97110 THERAPEUTIC EXERCISES: CPT

## 2017-12-11 PROCEDURE — 97112 NEUROMUSCULAR REEDUCATION: CPT

## 2017-12-11 NOTE — PROGRESS NOTES
PT DAILY TREATMENT NOTE 2-15    Patient Name: Lu Morejon  Date:2017  : 1970  [x]  Patient  Verified  Payor: BLUE CROSS / Plan: [facility] PPO / Product Type: PPO /    In time: 5:10pm   Out time: 6:04pm  Total Treatment Time (min): 54  Visit #: 10     Treatment Area: Lower back pain [M54.5]  Hip pain [M25.559]    SUBJECTIVE  Pain Level (0-10 scale): 0/10  Any medication changes, allergies to medications, adverse drug reactions, diagnosis change, or new procedure performed?: [x] No    [] Yes (see summary sheet for update)  Subjective functional status/changes:   [] No changes reported  The patient reports buying a TRX for her garage. She did have some left lumbar/SI joint pain this morning when she woke up (2/10 pain), but feels fine now. OBJECTIVE  40 min Therapeutic Exercise:  [x] See flow sheet :   Rationale: increase strength, improve coordination, improve balance and increase proprioception to improve the patients ability to perform daily activities with less pain and discomfort. 10 min Neuromuscular Re-education:  [x]  See flow sheet :   Rationale: increase strength, improve coordination, improve balance and increase proprioception  to improve the patients ability to perform daily activities. 4 min Manual Therapy:  Assessed and reassessed for R anterior innominate and shot gun technique   Rationale: decrease pain and increase postural awareness  to improve the patients ability to maintain proper alignment to tolerate exercises and therapy session.      With   [x] TE   [] TA   [] neuro   [] other: Patient Education: [x] Review HEP    [x] Progressed/Changed HEP based on:   [x] positioning   [x] body mechanics   [] transfers   [] heat/ice application    [x] other:      Other Objective/Functional Measures: patient presented with R anterior innominate today     Pain Level (0-10 scale) post treatment: 0/10    ASSESSMENT/Changes in Function:     The patient presented with a return of R anterior innominate today however responded well to supine MET and shot gun approach. The patient tolerated progression glute and core exercises today. She demonstrates decreases hip stability on the R compared to left evident with all SLS exercises. Patient will continue to benefit from skilled PT services to modify and progress therapeutic interventions, address functional mobility deficits, address ROM deficits, address strength deficits, analyze and address soft tissue restrictions, analyze and cue movement patterns, analyze and modify body mechanics/ergonomics, assess and modify postural abnormalities and instruct in home and community integration to attain remaining goals. [x]  See Plan of Care  []  See progress note/recertification  []  See Discharge Summary         Progress towards goals / Updated goals: The patient is progressing towards goals.      PLAN  [x]  Upgrade activities as tolerated     []  Continue plan of care  [x]  Update interventions per flow sheet       []  Discharge due to:_  []  Other:_      Yasmine Khoury 12/11/2017  6:03 PM

## 2017-12-13 ENCOUNTER — HOSPITAL ENCOUNTER (OUTPATIENT)
Dept: PHYSICAL THERAPY | Age: 47
Discharge: HOME | End: 2017-12-13
Payer: COMMERCIAL

## 2017-12-13 PROCEDURE — 97110 THERAPEUTIC EXERCISES: CPT

## 2017-12-13 PROCEDURE — 97140 MANUAL THERAPY 1/> REGIONS: CPT

## 2017-12-13 PROCEDURE — 97016 VASOPNEUMATIC DEVICE THERAPY: CPT

## 2017-12-13 NOTE — PROGRESS NOTES
PT DAILY TREATMENT NOTE 2-15    Patient Name: Germán Gould  Date:2017  : 1970  [x]  Patient  Verified  Payor: BLUE CROSS / Plan: [facility] PPO / Product Type: PPO /    In time:5:00pm  Out time:6:10pm  Total Treatment Time (min): 70   Visit #: 8     Treatment Area: Lower back pain [M54.5]  Hip pain [M25.559]    SUBJECTIVE  Pain Level (0-10 scale): hip= 2/10 knee= 5   Any medication changes, allergies to medications, adverse drug reactions, diagnosis change, or new procedure performed?: [x] No    [] Yes (see summary sheet for update)  Subjective functional status/changes:   [] No changes reported  The patient said she has had the worst 2 days and has had increased bilateral knee pain L>R. Patient felt the pain in the L knee right after she left last visit and feels as though it was the lunges.      OBJECTIVE    Modality rationale: decrease edema, decrease inflammation and decrease pain to improve the patients ability to perform daily activities with less pain and discomfort    Min Type Additional Details    [] Estim: []Att   []Unatt        []TENS instruct                  []IFC  []Premod   []NMES                     []Other:  []w/US   []w/ice   []w/heat  Position:  Location:    []  Traction: [] Cervical       []Lumbar                       [] Prone          []Supine                       []Intermittent   []Continuous Lbs:  [] before manual  [] after manual  []w/heat    []  Ultrasound: []Continuous   [] Pulsed at:                            []1MHz   []3MHz Location:  W/cm2:    []  Paraffin         Location:  []w/heat    []  Ice     []  Heat  []  Ice massage Position:  Location:    []  Laser  []  Other: Position:  Location:   15 [x]  Vasopneumatic Device Pressure:       [] lo [] med [x] hi   Temperature: 34   [x] Skin assessment post-treatment:  [x]intact []redness- no adverse reaction    []redness  adverse reaction:     47 min Therapeutic Exercise:  [x] See flow sheet : Rationale: increase ROM, increase strength, improve coordination, improve balance and increase proprioception to improve the patients ability to perform daily activities with less pain and discomfort. 8 min Manual Therapy:  Fat pad taping to left patellar tendon    Rationale: decrease pain and increase postural awareness  to improve the patients ability to perform daily activities with less pain and discomfort. With   [x] TE   [] TA   [] neuro   [] other: Patient Education: [x] Review HEP    [x] Progressed/Changed HEP based on:   [x] positioning   [] body mechanics   [] transfers   [x] heat/ice application    [] other:      Other Objective/Functional Measures: patient had swelling under the patellar tendon     Pain Level (0-10 scale) post treatment: low back/hip= 2/10 knee= (0/10 frozen)     ASSESSMENT/Changes in Function:     The patient presented to clinic with bilaterally knee pain with left leg patellar tendon irritated prepatellar tendon swelling. The patient responded well to fat pad taping and was able to tolerate  low impact glute, hamstring and core strengthening exercises. Patient will continue to benefit from skilled PT services to modify and progress therapeutic interventions, address functional mobility deficits, address ROM deficits, address strength deficits, analyze and address soft tissue restrictions, analyze and cue movement patterns, analyze and modify body mechanics/ergonomics, assess and modify postural abnormalities and instruct in home and community integration to attain remaining goals. [x]  See Plan of Care  []  See progress note/recertification  []  See Discharge Summary         Progress towards goals / Updated goals: The patient is progressing towards goals.      PLAN  [x]  Upgrade activities as tolerated     [x]  Continue plan of care  [x]  Update interventions per flow sheet       []  Discharge due to:_  []  Other:_      Brianda Power 12/13/2017  4:57 PM

## 2017-12-18 ENCOUNTER — APPOINTMENT (OUTPATIENT)
Dept: PHYSICAL THERAPY | Age: 47
End: 2017-12-18
Payer: COMMERCIAL

## 2017-12-28 ENCOUNTER — HOSPITAL ENCOUNTER (OUTPATIENT)
Dept: PHYSICAL THERAPY | Age: 47
Discharge: HOME | End: 2017-12-28
Payer: COMMERCIAL

## 2017-12-28 PROCEDURE — 97110 THERAPEUTIC EXERCISES: CPT | Performed by: PHYSICAL THERAPIST

## 2017-12-28 NOTE — PROGRESS NOTES
PT DAILY TREATMENT NOTE 2-15    Patient Name: Zuleyma Morris  Date:2017  : 1970  [x]  Patient  Verified  Payor: BLUE CROSS / Plan: [id number] PPO / Product Type: PPO /    In time: 8:40am  Out time: 9:35am  Total Treatment Time (min): 55  Visit #: 12     Treatment Area: Lower back pain [M54.5]  Hip pain [M25.559]    SUBJECTIVE  Pain Level (0-10 scale): back: 1; knee: 2  Any medication changes, allergies to medications, adverse drug reactions, diagnosis change, or new procedure performed?: [x] No    [] Yes (see summary sheet for update)  Subjective functional status/changes:   [] No changes reported  The patient reports that her back and hip felt fine while in  [address], but her left knee hurt a lot (she used a knee brace that helped). OBJECTIVE    55 min Therapeutic Exercise:  [x] See flow sheet :   Rationale: increase ROM, increase strength, improve coordination and improve balance to improve the patients ability to perform daily activities. With   [x] TE   [] TA   [] neuro   [] other: Patient Education: [x] Review HEP    [x] Progressed/Changed HEP based on:   [x] positioning   [x] body mechanics   [] transfers   [] heat/ice application    [] other:      Other Objective/Functional Measures: Pt. Had minimal increase in knee pain with TRX hip thrusters     Pain Level (0-10 scale) post treatment: back: 1; knee: 2    ASSESSMENT/Changes in Function:     The patient progressed with new dynamic TRX strengthening therex with minimal knee pain/irritation.  Patient will continue to benefit from skilled PT services to modify and progress therapeutic interventions, address functional mobility deficits, address ROM deficits, address strength deficits, analyze and address soft tissue restrictions, analyze and cue movement patterns, analyze and modify body mechanics/ergonomics, assess and modify postural abnormalities, address imbalance/dizziness and instruct in home and community integration to attain remaining goals. [x]  See Plan of Care  []  See progress note/recertification  []  See Discharge Summary         Progress towards goals / Updated goals: The patient is progressing towards goals.     PLAN  [x]  Upgrade activities as tolerated     [x]  Continue plan of care  [x]  Update interventions per flow sheet       []  Discharge due to:_  []  Other:_      Kurtis Florentino, PT 12/28/2017  8:56 AM

## 2018-01-03 ENCOUNTER — HOSPITAL ENCOUNTER (OUTPATIENT)
Dept: PHYSICAL THERAPY | Age: 48
Discharge: HOME | End: 2018-01-03
Payer: COMMERCIAL

## 2018-01-03 PROCEDURE — 97110 THERAPEUTIC EXERCISES: CPT | Performed by: PHYSICAL THERAPIST

## 2018-01-03 NOTE — PROGRESS NOTES
PT DAILY TREATMENT NOTE 2-15    Patient Name: Kandice Smith  Date:1/3/2018  : 1970  [x]  Patient  Verified  Payor: BLUE CROSS / Plan: [facility] PPO / Product Type: PPO /    In time:3:35pm  Out time: 4:20pm  Total Treatment Time (min): 45  Visit #: 13     Treatment Area: Lower back pain [M54.5]  Hip pain [M25.559]    SUBJECTIVE  Pain Level (0-10 scale): knee/lumbar: 0/2  Any medication changes, allergies to medications, adverse drug reactions, diagnosis change, or new procedure performed?: [x] No    [] Yes (see summary sheet for update)  Subjective functional status/changes:   [] No changes reported  The patient reports that she has been able to make adjustments to her daily activities to decrease her minimal lumbar pain, but her knee is doing a lot better. OBJECTIVE    45 min Therapeutic Exercise:  [x] See flow sheet :   Rationale: increase ROM, increase strength and improve coordination to improve the patients ability to perform daily activities. With   [x] TE   [] TA   [] neuro   [] other: Patient Education: [x] Review HEP    [x] Progressed/Changed HEP based on:   [x] positioning   [x] body mechanics   [] transfers   [] heat/ice application    [] other:      Other Objective/Functional Measures: FOTO= 83 (57 at eval)     Pain Level (0-10 scale) post treatment: knee/lumbar: 0/0    ASSESSMENT/Changes in Function:     Patient has progressed well and MET goals and will be discharged. []  See Plan of Care  []  See progress note/recertification  [x]  See Discharge Summary         Progress towards goals / Updated goals:  Pt. Has MET or is progressing towards goals. PLAN  []  Upgrade activities as tolerated     []  Continue plan of care  []  Update interventions per flow sheet       [x]  Discharge due to: Pt.  Has MET most goals  []  Other:Delmy Lowe, PT 1/3/2018  4:23 PM

## 2018-01-03 NOTE — PROGRESS NOTES
[facility] Physical Therapy  [facility] ([facility] [address]  Phone: [phone] Fax: [phone]    Discharge Summary  2-15    Patient name: Jesisca Silverman  : 1970  Provider#: [id number]  Referral source: Sena Taylor MD      Medical/Treatment Diagnosis: Lower back pain [M54.5]  Hip pain [M25.559]     Prior Hospitalization: see medical history     Comorbidities: See Plan of Care  Prior Level of Function:See Plan of Care  Medications: Verified on Patient Summary List    Start of Care: 10/26/17      Onset Date: chronic   Visits from Start of Care: 13     Missed Visits: 1  Reporting Period : 10/26/17 to 1/3/18      ASSESSMENT/SUMMARY OF CARE: The patient has progressed significantly with her left lumbar/SI/hip pain with improved core, lumbar, and hip strength and mobility. She was able to go to [employer] and did not have any lumbar/hip pain after hours of walking. During her therapy, it was noticed that her right heel at a larger calcaneal fat pad, causing a noticeable leg length discrepancy with weightbearing activities. She now has a small left heel lift in her shoe, which proved to be significantly helpful while on vacation and on her feet. The patient has advanced with a significant dynamic home exercise program that will continue to maintain or improve her symptoms, which she will continue to occasionally have with prolonged sitting only, but she's been able to make adjustments to reduce her pain accordingly. Short Term Goals: To be accomplished in 4 treatments:                         TENRDWM will be able to sit for 10 minutes with less then 3/10 pain in order to still for graduate school work. -MET                          XJMCOMT will be able to tolerate sleep without being woken up by the pain 3 nights in a row.  -MET  Long Term Goals: To be accomplished in 16 treatments:                         KFRLKFR will be able to sit for 30+ minutes with less then 2/10 pain in order to drive her kids around in the afternoon with less pain. - Frequently MET                          XPCLNOA will be able to improve FOTO score to >/= 69 in order to be able to perform usual school work and household activities.  -MET        RECOMMENDATIONS:  [x]Discontinue therapy: [x]Patient has reached or is progressing toward set goals      []Patient is non-compliant or has abdicated      []Due to lack of appreciable progress towards set goals      []Other    Girma Ma, PT 1/3/2018 4:35 PM

## 2018-10-13 ENCOUNTER — CLINICAL SUPPORT (OUTPATIENT)
Dept: PRIMARY CARE CLINIC | Age: 48
End: 2018-10-13

## 2018-10-13 DIAGNOSIS — Z23 ENCOUNTER FOR IMMUNIZATION: Primary | ICD-10-CM

## 2018-10-13 NOTE — PATIENT INSTRUCTIONS
Vaccine Information Statement    Influenza (Flu) Vaccine (Inactivated or Recombinant): What you need to know    Many Vaccine Information Statements are available in Kiswahili and other languages. See www.immunize.org/vis  Hojas de Información Sobre Vacunas están disponibles en Español y en muchos otros idiomas. Visite www.immunize.org/vis    1. Why get vaccinated? Influenza (flu) is a contagious disease that spreads around the [place] every year, usually between October and May. Flu is caused by influenza viruses, and is spread mainly by coughing, sneezing, and close contact. Anyone can get flu. Flu strikes suddenly and can last several days. Symptoms vary by age, but can include:   fever/chills   sore throat   muscle aches   fatigue   cough   headache    runny or stuffy nose    Flu can also lead to pneumonia and blood infections, and cause diarrhea and seizures in children. If you have a medical condition, such as heart or lung disease, flu can make it worse. Flu is more dangerous for some people. Infants and young children, people 72years of age and older, pregnant women, and people with certain health conditions or a weakened immune system are at greatest risk. Each year thousands of people in the [facility] die from flu, and many more are hospitalized. Flu vaccine can:   keep you from getting flu,   make flu less severe if you do get it, and   keep you from spreading flu to your family and other people. 2. Inactivated and recombinant flu vaccines    A dose of flu vaccine is recommended every flu season. Children 6 months through 6years of age may need two doses during the same flu season. Everyone else needs only one dose each flu season.        Some inactivated flu vaccines contain a very small amount of a mercury-based preservative called thimerosal. Studies have not shown thimerosal in vaccines to be harmful, but flu vaccines that do not contain thimerosal are available. There is no live flu virus in flu shots. They cannot cause the flu. There are many flu viruses, and they are always changing. Each year a new flu vaccine is made to protect against three or four viruses that are likely to cause disease in the upcoming flu season. But even when the vaccine doesnt exactly match these viruses, it may still provide some protection    Flu vaccine cannot prevent:   flu that is caused by a virus not covered by the vaccine, or   illnesses that look like flu but are not. It takes about 2 weeks for protection to develop after vaccination, and protection lasts through the flu season. 3. Some people should not get this vaccine    Tell the person who is giving you the vaccine:     If you have any severe, life-threatening allergies. If you ever had a life-threatening allergic reaction after a dose of flu vaccine, or have a severe allergy to any part of this vaccine, you may be advised not to get vaccinated. Most, but not all, types of flu vaccine contain a small amount of egg protein.  If you ever had Guillain-Barré Syndrome (also called GBS). Some people with a history of GBS should not get this vaccine. This should be discussed with your doctor.  If you are not feeling well. It is usually okay to get flu vaccine when you have a mild illness, but you might be asked to come back when you feel better. 4. Risks of a vaccine reaction    With any medicine, including vaccines, there is a chance of reactions. These are usually mild and go away on their own, but serious reactions are also possible. Most people who get a flu shot do not have any problems with it.      Minor problems following a flu shot include:    soreness, redness, or swelling where the shot was given     hoarseness   sore, red or itchy eyes   cough   fever   aches   headache   itching   fatigue  If these problems occur, they usually begin soon after the shot and last 1 or 2 days. More serious problems following a flu shot can include the following:     There may be a small increased risk of Guillain-Barré Syndrome (GBS) after inactivated flu vaccine. This risk has been estimated at 1 or 2 additional cases per million people vaccinated. This is much lower than the risk of severe complications from flu, which can be prevented by flu vaccine.  Young children who get the flu shot along with pneumococcal vaccine (PCV13) and/or DTaP vaccine at the same time might be slightly more likely to have a seizure caused by fever. Ask your doctor for more information. Tell your doctor if a child who is getting flu vaccine has ever had a seizure. Problems that could happen after any injected vaccine:      People sometimes faint after a medical procedure, including vaccination. Sitting or lying down for about 15 minutes can help prevent fainting, and injuries caused by a fall. Tell your doctor if you feel dizzy, or have vision changes or ringing in the ears.  Some people get severe pain in the shoulder and have difficulty moving the arm where a shot was given. This happens very rarely.  Any medication can cause a severe allergic reaction. Such reactions from a vaccine are very rare, estimated at about 1 in a million doses, and would happen within a few minutes to a few hours after the vaccination. As with any medicine, there is a very remote chance of a vaccine causing a serious injury or death. The safety of vaccines is always being monitored. For more information, visit: www.cdc.gov/vaccinesafety/    5. What if there is a serious reaction? What should I look for?  Look for anything that concerns you, such as signs of a severe allergic reaction, very high fever, or unusual behavior.     Signs of a severe allergic reaction can include hives, swelling of the face and throat, difficulty breathing, a fast heartbeat, dizziness, and weakness - usually within a few minutes to a few hours after the vaccination. What should I do?  If you think it is a severe allergic reaction or other emergency that cant wait, call 9-1-1 and get the person to the nearest hospital. Otherwise, call your doctor.  Reactions should be reported to the Vaccine Adverse Event Reporting System (VAERS). Your doctor should file this report, or you can do it yourself through  the VAERS web site at www.vaers. [facility].gov, or by calling 1-733.784.1952. VAERS does not give medical advice. 6. The National Vaccine Injury Compensation Program    The [facility] Vaccine Injury Compensation Program (VICP) is a federal program that was created to compensate people who may have been injured by certain vaccines. Persons who believe they may have been injured by a vaccine can learn about the program and about filing a claim by calling 9-994.476.8950 or visiting the [employer] website at www.[facility].gov/vaccinecompensation. There is a time limit to file a claim for compensation. 7. How can I learn more?  Ask your healthcare provider. He or she can give you the vaccine package insert or suggest other sources of information.  Call your local or state health department.  Contact the Centers for Disease Control and Prevention (CDC):  - Call 7-922.656.2121 (1-800-CDC-INFO) or  - Visit CDCs website at www.cdc.gov/flu    Vaccine Information Statement   Inactivated Influenza Vaccine   8/7/2015  42 CHRISTA Nation [id number]    Department of Health and Human Services  Centers for Disease Control and Prevention    Office Use Only

## 2019-03-07 ENCOUNTER — OFFICE VISIT (OUTPATIENT)
Dept: PRIMARY CARE CLINIC | Age: 49
End: 2019-03-07

## 2019-03-07 VITALS
OXYGEN SATURATION: 98 % | TEMPERATURE: 97.8 F | RESPIRATION RATE: 18 BRPM | HEIGHT: 64 IN | HEART RATE: 104 BPM | SYSTOLIC BLOOD PRESSURE: 144 MMHG | DIASTOLIC BLOOD PRESSURE: 87 MMHG | BODY MASS INDEX: 29.81 KG/M2 | WEIGHT: 174.6 LBS

## 2019-03-07 DIAGNOSIS — R03.0 ELEVATED BLOOD PRESSURE READING: ICD-10-CM

## 2019-03-07 DIAGNOSIS — N89.8 VAGINAL IRRITATION: ICD-10-CM

## 2019-03-07 DIAGNOSIS — N89.8 VAGINAL ITCHING: Primary | ICD-10-CM

## 2019-03-07 LAB
BILIRUB UR QL STRIP: NEGATIVE
GLUCOSE UR-MCNC: NEGATIVE MG/DL
KETONES P FAST UR STRIP-MCNC: NEGATIVE MG/DL
PH UR STRIP: 7 [PH] (ref 4.6–8)
PROT UR QL STRIP: NEGATIVE
SP GR UR STRIP: 1.01 (ref 1–1.03)
UA UROBILINOGEN AMB POC: NORMAL (ref 0.2–1)
URINALYSIS CLARITY POC: CLEAR
URINALYSIS COLOR POC: YELLOW
URINE BLOOD POC: NORMAL
URINE LEUKOCYTES POC: NORMAL
URINE NITRITES POC: NEGATIVE

## 2019-03-07 RX ORDER — CLOTRIMAZOLE AND BETAMETHASONE DIPROPIONATE 10; .64 MG/G; MG/G
CREAM TOPICAL 2 TIMES DAILY
Qty: 15 G | Refills: 0 | Status: SHIPPED | OUTPATIENT
Start: 2019-03-07 | End: 2019-05-24 | Stop reason: ALTCHOICE

## 2019-03-07 RX ORDER — FLUCONAZOLE 150 MG/1
150 TABLET ORAL DAILY
Qty: 2 TAB | Refills: 0 | Status: SHIPPED | OUTPATIENT
Start: 2019-03-07 | End: 2019-05-24 | Stop reason: ALTCHOICE

## 2019-03-07 NOTE — PATIENT INSTRUCTIONS

## 2019-03-07 NOTE — PROGRESS NOTES
Subjective:     Tahmina Obrien is a 54 y.o. female who complains of vaginal itching and irritation for 2 days. Feels like yeast inf and has had in past.  Pt believes she may be scratching herself at night and noted blood on toilet tissues. Denies any dysuria, urgency, or frequency. Tried warm compress, vinegar, and tea tree oil. Patient denies flank pain, nausea, vomiting, fever, abdominal pain, unusual vaginal discharge. Patient does not have a history of recurrent UTI. Patient does not have a history of pyelonephritis. Denies any concern for STI. Allergies   Allergen Reactions    Penicillins Nausea Only    Sulfa (Sulfonamide Antibiotics) Nausea Only     Past Medical History:   Diagnosis Date    Asthma      Past Surgical History:   Procedure Laterality Date    HX HEENT      wisdom teeth extraction    HX ORTHOPAEDIC      left knee arthroscopy    HX ORTHOPAEDIC      left shoulder arthroscopy    HX ORTHOPAEDIC      left foot schwanoma excision        Review of Systems  Pertinent items are noted in HPI. Objective:     Visit Vitals  /87 (BP 1 Location: Left arm, BP Patient Position: Sitting)   Pulse (!) 104   Temp 97.8 °F (36.6 °C) (Oral)   Resp 18   Ht 5' 4\" (1.626 m)   Wt 174 lb 9.6 oz (79.2 kg)   SpO2 98%   BMI 29.97 kg/m²     General:  alert, cooperative, no distress   Abdomen: soft, nontender, nondistended, no masses or organomegaly.     Back:  CVA tenderness absent   :  defer exam     Laboratory:   Urine dipstick shows   Results for orders placed or performed in visit on 03/07/19   AMB POC URINALYSIS DIP STICK AUTO W/O MICRO   Result Value Ref Range    Color (UA POC) Yellow     Clarity (UA POC) Clear     Glucose (UA POC) Negative Negative    Bilirubin (UA POC) Negative Negative    Ketones (UA POC) Negative Negative    Specific gravity (UA POC) 1.010 1.001 - 1.035    Blood (UA POC) Trace Negative    pH (UA POC) 7.0 4.6 - 8.0    Protein (UA POC) Negative Negative    Urobilinogen (UA POC) 0.2 mg/dL 0.2 - 1    Nitrites (UA POC) Negative Negative    Leukocyte esterase (UA POC) Trace Negative           Assessment/Plan:       ICD-10-CM ICD-9-CM    1. Vaginal itching N89.8 698.1 AMB POC URINALYSIS DIP STICK AUTO W/O MICRO      CULTURE, URINE   2. Vaginal irritation N89.8 623.9 AMB POC URINALYSIS DIP STICK AUTO W/O MICRO      CULTURE, URINE   3. Elevated blood pressure reading R03.0 796.2    -Discussed w/ pt. Pt aware of previous elevated BP readings as well. Working on wt loss - doing [employer]. Encouraged pt to continue to monitor and f/u with PCP if BP consistently > 140/90. Orders Placed This Encounter    CULTURE, URINE    AMB POC URINALYSIS DIP STICK AUTO W/O MICRO    fluconazole (DIFLUCAN) 150 mg tablet    clotrimazole-betamethasone (LOTRISONE) topical cream     Declines pelvic exam.    Follow up if symptoms not improving, and prn. Swati Nurse, NP  This note will not be viewable in [address]

## 2019-03-07 NOTE — PROGRESS NOTES
Chief Complaint   Patient presents with    Urinary Burning    Vaginal Itching   Pt c/o urinary burning and vaginal irritation x 1 day,   This note will not be viewable in MyChart.

## 2019-03-09 LAB — BACTERIA UR CULT: NO GROWTH

## 2019-03-13 NOTE — PROGRESS NOTES
Called and left detailed phone message, per patients request because she is a  and is unable to answer phone call, advised urine culture was negative, no UTI, advised to call office back with any questions or concerns

## 2019-05-24 ENCOUNTER — OFFICE VISIT (OUTPATIENT)
Dept: PRIMARY CARE CLINIC | Age: 49
End: 2019-05-24

## 2019-05-24 VITALS
TEMPERATURE: 98.3 F | OXYGEN SATURATION: 98 % | SYSTOLIC BLOOD PRESSURE: 126 MMHG | DIASTOLIC BLOOD PRESSURE: 84 MMHG | BODY MASS INDEX: 28.44 KG/M2 | HEART RATE: 76 BPM | WEIGHT: 166.6 LBS | RESPIRATION RATE: 16 BRPM | HEIGHT: 64 IN

## 2019-05-24 DIAGNOSIS — J02.9 VIRAL PHARYNGITIS: Primary | ICD-10-CM

## 2019-05-24 LAB
S PYO AG THROAT QL: NEGATIVE
VALID INTERNAL CONTROL?: YES

## 2019-05-24 RX ORDER — NORETHINDRONE ACETATE AND ETHINYL ESTRADIOL 1; 20 MG/1; UG/1
TABLET ORAL
Refills: 4 | COMMUNITY
Start: 2019-05-17

## 2019-05-24 NOTE — PROGRESS NOTES
Subjective:   Roma Turner is a 54 y.o. female who complains of congestion, sore throat and pain while swallowing for 1 day, stable since that time. Denies any fevers, chills, cough, ear pain, sinus pain, N/V/D  + sick contacts at work. She denies a history of shortness of breath and wheezing. Evaluation to date: none. Treatment to date: OTC products - cough drops. Patient does not smoke cigarettes. Relevant PMH:   Past Medical History:   Diagnosis Date    Asthma      Past Surgical History:   Procedure Laterality Date    HX HEENT      wisdom teeth extraction    HX ORTHOPAEDIC      left knee arthroscopy    HX ORTHOPAEDIC      left shoulder arthroscopy    HX ORTHOPAEDIC      left foot schwanoma excision     Allergies   Allergen Reactions    Penicillins Nausea Only    Sulfa (Sulfonamide Antibiotics) Nausea Only         Review of Systems  Pertinent items are noted in HPI. Objective:     Visit Vitals  /84   Pulse 76   Temp 98.3 °F (36.8 °C) (Oral)   Resp 16   Ht 5' 4\" (1.626 m)   Wt 166 lb 9.6 oz (75.6 kg)   SpO2 98%   BMI 28.60 kg/m²     General:  alert, cooperative, no distress   Eyes: negative   Ears: normal TM's and external ear canals AU   Sinuses: Normal paranasal sinuses without tenderness   Mouth:  Lips, mucosa, and tongue normal. Teeth and gums normal and abnormal findings: moderate oropharyngeal erythema and tonsillar hypertrophy 1+   Neck: supple, symmetrical, trachea midline and no adenopathy. Heart: S1 and S2 normal, no murmurs noted. Lungs: clear to auscultation bilaterally       Results for orders placed or performed in visit on 05/24/19   AMB POC RAPID STREP A   Result Value Ref Range    VALID INTERNAL CONTROL POC Yes     Group A Strep Ag Negative Negative          Assessment/Plan:       ICD-10-CM ICD-9-CM    1. Viral pharyngitis J02.9 462 AMB POC RAPID STREP A       Suggested symptomatic OTC remedies. RTC prn.       Robert Fernandez NP  This note will not be viewable in MyChart.

## 2019-05-24 NOTE — PATIENT INSTRUCTIONS
Sore Throat: Care Instructions Your Care Instructions Infection by bacteria or a virus causes most sore throats. Cigarette smoke, dry air, air pollution, allergies, and yelling can also cause a sore throat. Sore throats can be painful and annoying. Fortunately, most sore throats go away on their own. If you have a bacterial infection, your doctor may prescribe antibiotics. Follow-up care is a key part of your treatment and safety. Be sure to make and go to all appointments, and call your doctor if you are having problems. It's also a good idea to know your test results and keep a list of the medicines you take. How can you care for yourself at home? · If your doctor prescribed antibiotics, take them as directed. Do not stop taking them just because you feel better. You need to take the full course of antibiotics. · Gargle with warm salt water once an hour to help reduce swelling and relieve discomfort. Use 1 teaspoon of salt mixed in 1 cup of warm water. · Take an over-the-counter pain medicine, such as acetaminophen (Tylenol), ibuprofen (Advil, Motrin), or naproxen (Aleve). Read and follow all instructions on the label. · Be careful when taking over-the-counter cold or flu medicines and Tylenol at the same time. Many of these medicines have acetaminophen, which is Tylenol. Read the labels to make sure that you are not taking more than the recommended dose. Too much acetaminophen (Tylenol) can be harmful. · Drink plenty of fluids. Fluids may help soothe an irritated throat. Hot fluids, such as tea or soup, may help decrease throat pain. · Use over-the-counter throat lozenges to soothe pain. Regular cough drops or hard candy may also help. These should not be given to young children because of the risk of choking. · Do not smoke or allow others to smoke around you. If you need help quitting, talk to your doctor about stop-smoking programs and medicines. These can increase your chances of quitting for good. · Use a vaporizer or humidifier to add moisture to your bedroom. Follow the directions for cleaning the machine. When should you call for help? Call your doctor now or seek immediate medical care if: 
  · You have new or worse trouble swallowing.  
  · Your sore throat gets much worse on one side.  
 Watch closely for changes in your health, and be sure to contact your doctor if you do not get better as expected. Where can you learn more? Go to http://donald-mg.info/. Enter [phone] [id number] in the search box to learn more about \"Sore Throat: Care Instructions. \" Current as of: March 27, 2018 Content Version: 11.9 © 1449-6634 [employer]. Care instructions adapted under license by [employer] (which disclaims liability or warranty for this information). If you have questions about a medical condition or this instruction, always ask your healthcare professional. [place] [address] any warranty or liability for your use of this information.

## 2019-10-05 ENCOUNTER — CLINICAL SUPPORT (OUTPATIENT)
Dept: PRIMARY CARE CLINIC | Age: 49
End: 2019-10-05

## 2019-10-05 DIAGNOSIS — Z23 ENCOUNTER FOR IMMUNIZATION: Primary | ICD-10-CM

## 2019-10-05 NOTE — PATIENT INSTRUCTIONS
Vaccine Information Statement Influenza (Flu) Vaccine (Inactivated or Recombinant): What You Need to Know Many Vaccine Information Statements are available in Sami and other languages. See www.immunize.org/vis Hojas de información sobre vacunas están disponibles en español y en muchos otros idiomas. Visite www.immunize.org/vis 1. Why get vaccinated? Influenza vaccine can prevent influenza (flu). Flu is a contagious disease that spreads around the [facility] every year, usually between October and May. Anyone can get the flu, but it is more dangerous for some people. Infants and young children, people 72years of age and older, pregnant women, and people with certain health conditions or a weakened immune system are at greatest risk of flu complications. Pneumonia, bronchitis, sinus infections and ear infections are examples of flu-related complications. If you have a medical condition, such as heart disease, cancer or diabetes, flu can make it worse. Flu can cause fever and chills, sore throat, muscle aches, fatigue, cough, headache, and runny or stuffy nose. Some people may have vomiting and diarrhea, though this is more common in children than adults. Each year thousands of people in the [facility] die from flu, and many more are hospitalized. Flu vaccine prevents millions of illnesses and flu-related visits to the doctor each year. 2. Influenza vaccines CDC recommends everyone 10months of age and older get vaccinated every flu season. Children 6 months through 6years of age may need 2 doses during a single flu season. Everyone else needs only 1 dose each flu season. It takes about 2 weeks for protection to develop after vaccination. There are many flu viruses, and they are always changing. Each year a new flu vaccine is made to protect against three or four viruses that are likely to cause disease in the upcoming flu season.  Even when the vaccine doesnt exactly match these viruses, it may still provide some protection. Influenza vaccine does not cause flu. Influenza vaccine may be given at the same time as other vaccines. 3. Talk with your health care provider Tell your vaccine provider if the person getting the vaccine: 
 Has had an allergic reaction after a previous dose of influenza vaccine, or has any severe, life-threatening allergies.  Has ever had Guillain-Barré Syndrome (also called GBS). In some cases, your health care provider may decide to postpone influenza vaccination to a future visit. People with minor illnesses, such as a cold, may be vaccinated. People who are moderately or severely ill should usually wait until they recover before getting influenza vaccine. Your health care provider can give you more information. 4. Risks of a reaction  Soreness, redness, and swelling where shot is given, fever, muscle aches, and headache can happen after influenza vaccine.  There may be a very small increased risk of Guillain-Barré Syndrome (GBS) after inactivated influenza vaccine (the flu shot). [facility] children who get the flu shot along with pneumococcal vaccine (PCV13), and/or DTaP vaccine at the same time might be slightly more likely to have a seizure caused by fever. Tell your health care provider if a child who is getting flu vaccine has ever had a seizure. People sometimes faint after medical procedures, including vaccination. Tell your provider if you feel dizzy or have vision changes or ringing in the ears. As with any medicine, there is a very remote chance of a vaccine causing a severe allergic reaction, other serious injury, or death. 5. What if there is a serious problem? An allergic reaction could occur after the vaccinated person leaves the clinic.  If you see signs of a severe allergic reaction (hives, swelling of the face and throat, difficulty breathing, a fast heartbeat, dizziness, or weakness), call 9-1-1 and get the person to the nearest hospital. 
 
For other signs that concern you, call your health care provider. Adverse reactions should be reported to the Vaccine Adverse Event Reporting System (VAERS). Your health care provider will usually file this report, or you can do it yourself. Visit the VAERS website at www.vaers. hhs.gov or call 3-654.508.1362. VAERS is only for reporting reactions, and VAERS staff do not give medical advice. 6. The National Vaccine Injury Compensation Program 
 
The [facility] Vaccine Injury Compensation Program (VICP) is a federal program that was created to compensate people who may have been injured by certain vaccines. Visit the VICP website at www.hrsa.gov/vaccinecompensation or call 3-673.625.1321 to learn about the program and about filing a claim. There is a time limit to file a claim for compensation. 7. How can I learn more?  Ask your health care provider.  Call your local or state health department.  Contact the Centers for Disease Control and Prevention (CDC): 
- Call 0-128.921.8911 (1-268-IKR-INFO) or 
- Visit CDCs influenza website at www.cdc.gov/flu Vaccine Information Statement (Interim) Inactivated Influenza Vaccine 8/15/2019 
42 CHRISTA Randhawa [id number]-06 Department of Health and [employer] Centers for Disease Control and Prevention Office Use Only

## 2020-01-06 ENCOUNTER — OFFICE VISIT (OUTPATIENT)
Dept: PRIMARY CARE CLINIC | Age: 50
End: 2020-01-06

## 2020-01-06 VITALS
WEIGHT: 176 LBS | DIASTOLIC BLOOD PRESSURE: 86 MMHG | HEIGHT: 64 IN | HEART RATE: 79 BPM | SYSTOLIC BLOOD PRESSURE: 124 MMHG | OXYGEN SATURATION: 98 % | RESPIRATION RATE: 16 BRPM | TEMPERATURE: 98.1 F | BODY MASS INDEX: 30.05 KG/M2

## 2020-01-06 DIAGNOSIS — L70.9 ACNE, UNSPECIFIED ACNE TYPE: Primary | ICD-10-CM

## 2020-01-06 RX ORDER — AZITHROMYCIN 250 MG/1
TABLET, FILM COATED ORAL
COMMUNITY

## 2020-01-06 RX ORDER — NORETHINDRONE ACETATE AND ETHINYL ESTRADIOL 1MG-20(21)
KIT ORAL
COMMUNITY
Start: 2017-08-04

## 2020-01-06 RX ORDER — NORETHINDRONE ACETATE AND ETHINYL ESTRADIOL 1MG-20(21)
KIT ORAL
COMMUNITY
End: 2020-01-06 | Stop reason: SDUPTHER

## 2020-01-06 RX ORDER — LORATADINE 10 MG/1
TABLET ORAL
COMMUNITY
End: 2020-01-06 | Stop reason: SDUPTHER

## 2020-01-06 RX ORDER — NORETHINDRONE ACETATE AND ETHINYL ESTRADIOL 1; .02 MG/1; MG/1
TABLET ORAL
COMMUNITY
End: 2020-01-06 | Stop reason: SDUPTHER

## 2020-01-06 RX ORDER — MONTELUKAST SODIUM 10 MG/1
TABLET ORAL
COMMUNITY
Start: 2007-09-20 | End: 2020-01-06 | Stop reason: SDUPTHER

## 2020-01-06 NOTE — PROGRESS NOTES
Cristiano Patel is a 54 y.o. female    Room:4    Chief Complaint   Patient presents with    Other     Pt States \" has this growth on my right ear had it for a couple days but ut started bleeding today\". Visit Vitals  /86 (BP 1 Location: Left arm, BP Patient Position: Sitting)   Pulse 79   Temp 98.1 °F (36.7 °C) (Oral)   Resp 16   Ht 5' 4\" (1.626 m)   Wt 176 lb (79.8 kg)   SpO2 98%   BMI 30.21 kg/m²       Pain Scale: 0 - No pain/10    1. Have you been to the ER, urgent care clinic since your last visit? Hospitalized since your last visit? No    2. Have you seen or consulted any other health care providers outside of the [address] [place] [address] since your last visit? Include any pap smears or colon screening.  No

## 2020-01-09 NOTE — PATIENT INSTRUCTIONS
Acne: Care Instructions  Your Care Instructions  Acne is a skin problem that shows up as blackheads, whiteheads, and pimples. It most often affects the face, neck, and upper body. Acne occurs when oil and dead skin cells clog the skin's pores. Acne usually starts during the teen years and often lasts into adulthood. Gentle cleansing every day controls most mild acne. If home treatment does not work, your doctor may prescribe creams, antibiotics, or a stronger medicine called isotretinoin. Sometimes birth control pills help women who have monthly acne flare-ups. Follow-up care is a key part of your treatment and safety. Be sure to make and go to all appointments, and call your doctor if you are having problems. It's also a good idea to know your test results and keep a list of the medicines you take. How can you care for yourself at home? · Gently wash your face 1 or 2 times a day with warm (not hot) water and a mild soap or cleanser. Always rinse well. · Use an over-the-counter lotion or gel that contains benzoyl peroxide. Start with a small amount of 2.5% benzoyl peroxide and increase the strength as needed. Benzoyl peroxide works well for acne, but you may need to use it for up to 2 months before your acne starts to improve. · Apply acne cream, lotion, or gel to all the places you get pimples, blackheads, or whiteheads, not just where you have them now. Follow the instructions carefully. If your skin gets too dry and scaly or red and sore, reduce the amount. For the best results, apply medicines as directed. Try not to miss doses. · Do not squeeze or pick pimples and blackheads. This can cause infection and scarring. · Use only oil-free makeup, sunscreen, and other skin care products that will not clog your pores. · Wash your hair every day, and try to keep it off your face and shoulders. Consider pinning it back or cutting it short. When should you call for help?   Watch closely for changes in your health, and be sure to contact your doctor if:    · You have tried home treatment for 6 to 8 weeks and your acne is not better or gets worse. Your doctor may need to add to or change your treatment.     · Your pimples become large and hard or filled with fluid.     · Scars form after pimples heal.     · You feel sad or hopeless, lack energy, or have other signs of depression while you are taking the prescription medicine isotretinoin.     · You start to have other symptoms, such as facial hair growth in women or bone and muscle pain. Where can you learn more? Go to http://donald-mg.info/. Enter V108 in the search box to learn more about \"Acne: Care Instructions. \"  Current as of: April 1, 2019  Content Version: 12.2  © 5051-2973 [employer]. Care instructions adapted under license by [employer] (which disclaims liability or warranty for this information). If you have questions about a medical condition or this instruction, always ask your healthcare professional. [address] any warranty or liability for your use of this information.

## 2020-01-09 NOTE — PROGRESS NOTES
Subjective:      Vandana Magallanes is an 54 y.o. female who presents for follow up of acne. Current treatment: Symptom course: essentially resolved. Lesions are described as one spot on the right ear that is in the healing stages. Acne is primarily located on the generalized and intermittent. Objective:        Visit Vitals  /86 (BP 1 Location: Left arm, BP Patient Position: Sitting)   Pulse 79   Temp 98.1 °F (36.7 °C) (Oral)   Resp 16   Ht 5' 4\" (1.626 m)   Wt 176 lb (79.8 kg)   LMP  (LMP Unknown) Comment: dont get them has been 5 or 6 years   SpO2 98%   BMI 30.21 kg/m²     Lesion locations:  right ear   Lesion Morphology:  open comedones     Assessment/Plan:     Acne vulgaris, stable    ICD-10-CM ICD-9-CM    1. Acne, unspecified acne type L70.9 706.1       Discussed the causes, evaluation and treatment options for acne.

## 2022-06-09 ENCOUNTER — NEW PATIENT (OUTPATIENT)
Dept: URBAN - METROPOLITAN AREA CLINIC 38 | Facility: CLINIC | Age: 52
End: 2022-06-09

## 2022-06-09 DIAGNOSIS — H52.203: ICD-10-CM

## 2022-06-09 DIAGNOSIS — H52.4: ICD-10-CM

## 2022-06-09 DIAGNOSIS — H52.13: ICD-10-CM

## 2022-06-09 PROCEDURE — 92004 COMPRE OPH EXAM NEW PT 1/>: CPT

## 2022-06-09 PROCEDURE — 92015 DETERMINE REFRACTIVE STATE: CPT

## 2022-06-09 ASSESSMENT — KERATOMETRY
OD_AXISANGLE_DEGREES: 170
OS_K2POWER_DIOPTERS: 46.00
OD_K1POWER_DIOPTERS: 44.75
OS_AXISANGLE_DEGREES: 180
OD_K2POWER_DIOPTERS: 46.00
OS_K1POWER_DIOPTERS: 44.50
OS_AXISANGLE2_DEGREES: 90
OD_AXISANGLE2_DEGREES: 80

## 2022-06-09 ASSESSMENT — VISUAL ACUITY
OS_CC: 20/20
OU_SC: 20/25
OD_CC: J1
OU_CC: 20/20
OD_CC: 20/20
OD_SC: J4
OS_SC: 20/30-1
OU_SC: J4
OU_CC: J1
OS_SC: J4
OD_SC: 20/25
OS_CC: J1

## 2022-06-09 ASSESSMENT — TONOMETRY
OS_IOP_MMHG: 14
OD_IOP_MMHG: 15

## 2023-06-01 ENCOUNTER — COMPREHENSIVE EXAM (OUTPATIENT)
Dept: URBAN - METROPOLITAN AREA CLINIC 38 | Facility: CLINIC | Age: 53
End: 2023-06-01

## 2023-06-01 DIAGNOSIS — H52.13: ICD-10-CM

## 2023-06-01 DIAGNOSIS — H52.4: ICD-10-CM

## 2023-06-01 DIAGNOSIS — H52.203: ICD-10-CM

## 2023-06-01 PROCEDURE — 92014 COMPRE OPH EXAM EST PT 1/>: CPT

## 2023-06-01 PROCEDURE — 92015 DETERMINE REFRACTIVE STATE: CPT

## 2023-06-01 ASSESSMENT — TONOMETRY
OS_IOP_MMHG: 15
OD_IOP_MMHG: 15

## 2023-06-01 ASSESSMENT — VISUAL ACUITY
OS_CC: 20/20
OD_CC: J1
OD_CC: 20/20
OS_CC: J1

## 2023-06-01 ASSESSMENT — KERATOMETRY
OD_AXISANGLE2_DEGREES: 80
OD_AXISANGLE_DEGREES: 170
OS_K2POWER_DIOPTERS: 46.00
OD_K2POWER_DIOPTERS: 46.00
OD_K1POWER_DIOPTERS: 44.75
OS_AXISANGLE_DEGREES: 180
OS_AXISANGLE2_DEGREES: 90
OS_K1POWER_DIOPTERS: 44.50

## 2023-08-08 NOTE — PROGRESS NOTES
PT DAILY TREATMENT NOTE 2-15    Patient Name: Amparo Ramos  Date:2017  : 1970  [x]  Patient  Verified  Payor: BLUE CROSS / Plan: [facility] BLUE [facility] PPO / Product Type: PPO /    In time:4:31pm  Out time:5:40  Total Treatment Time (min): 71  Visit #: 6     Treatment Area: Lower back pain [M54.5]  Hip pain [M25.559]    SUBJECTIVE  Pain Level (0-10 scale): 4-5/10  Any medication changes, allergies to medications, adverse drug reactions, diagnosis change, or new procedure performed?: [x] No    [] Yes (see summary sheet for update)  Subjective functional status/changes:   [] No changes reported  The patient reports going back to yoga this weekend and she said she felt pretty good however noticed a couple of poses that were bothersome. OBJECTIVE    Modality rationale: decrease pain and increase tissue extensibility to improve the patients ability to perform daily activities.    Min Type Additional Details    [] Estim: []Att   []Unatt        []TENS instruct                  []IFC  []Premod   []NMES                     []Other:  []w/US   []w/ice   []w/heat  Position:  Location:    []  Traction: [] Cervical       []Lumbar                       [] Prone          []Supine                       []Intermittent   []Continuous Lbs:  [] before manual  [] after manual  []w/heat    []  Ultrasound: []Continuous   [] Pulsed at:                            []1MHz   []3MHz Location:  W/cm2:    []  Paraffin         Location:  []w/heat   10 []  Ice     [x]  Heat  []  Ice massage Position:supine hooklying  Location: lumbar/sacral region    []  Laser  []  Other: Position:  Location:    []  Vasopneumatic Device Pressure:       [] lo [] med [] hi   Temperature:    [x] Skin assessment post-treatment:  [x]intact []redness- no adverse reaction    []redness  adverse reaction:     34 min Therapeutic Exercise:  [x] See flow sheet :   Rationale: increase ROM, increase strength, improve coordination, improve balance and previous_has_had_botox Have You Had Botox Before?: has had botox increase proprioception to improve the patients ability to return to desired exercise routine and increase functional mobility. 25 min Manual Therapy:  Left sided grade 3/4 lateral and inferior hip mobilizations; Grade 4/5 longitudinal hip mob; Grade 4/5 upslip hip mob in prone   Rationale: decrease pain, increase ROM, increase tissue extensibility and increase postural awareness  to improve the patients ability to improve functional mobility to perform her daily activities. With   [x] TE   [] TA   [] neuro   [] other: Patient Education: [x] Review HEP    [x] Progressed/Changed HEP based on:   [x] positioning   [x] body mechanics   [] transfers   [] heat/ice application    [x] other: Discussed the option of dry needling     Other Objective/Functional Measures:   Left Hip IR= 40 deg      Pain Level (0-10 scale) post treatment: 4-5/10    ASSESSMENT/Changes in Function:     The patient continues to remain compliant with HEP and demonstrates self report of increased postural awareness throughout her day. The patient presented with a right anterior innominate dysfunction that responded well to manual work, improving both leg length test and left hip IR range of motion presentation. The patient was able to progress core strengthening exercises without any reports of low back or SI joint pain however continues to complain of general left sided hip tightness. The patient expressed her interest in dry needling and was educated on the treatment. Patient decided to continue with our manual and strengthening interventions until she sees the doctor on November 30th where she will revisit the option for dry needling.  Patient will continue to benefit from skilled PT services to modify and progress therapeutic interventions, address functional mobility deficits, address ROM deficits, address strength deficits, analyze and address soft tissue restrictions, analyze and cue movement patterns, analyze and modify body mechanics/ergonomics, assess and modify postural abnormalities and instruct in home and community integration to attain remaining goals. [x]  See Plan of Care  []  See progress note/recertification  []  See Discharge Summary         Progress towards goals / Updated goals: The patient is slowly progressing towards goals.      PLAN  [x]  Upgrade activities as tolerated     [x]  Continue plan of care  [x]  Update interventions per flow sheet       []  Discharge due to:_  []  Other:_      Rashid Ornelas 11/20/2017  5:42 PM

## 2023-12-05 ENCOUNTER — EMERGENCY VISIT (OUTPATIENT)
Dept: URBAN - METROPOLITAN AREA CLINIC 39 | Facility: CLINIC | Age: 53
End: 2023-12-05

## 2023-12-05 DIAGNOSIS — H04.121: ICD-10-CM

## 2023-12-05 PROCEDURE — 92012 INTRM OPH EXAM EST PATIENT: CPT

## 2023-12-05 RX ORDER — NEOMYCIN SULFATE, POLYMYXIN B SULFATE AND DEXAMETHASONE 3.5; 10000; 1 MG/ML; [USP'U]/ML; MG/ML
1 SUSPENSION OPHTHALMIC
Start: 2023-12-05

## 2023-12-05 ASSESSMENT — KERATOMETRY
OD_AXISANGLE2_DEGREES: 80
OS_AXISANGLE_DEGREES: 180
OS_K1POWER_DIOPTERS: 44.50
OS_K2POWER_DIOPTERS: 46.00
OD_K2POWER_DIOPTERS: 46.00
OD_K1POWER_DIOPTERS: 44.75
OD_AXISANGLE_DEGREES: 170
OS_AXISANGLE2_DEGREES: 90

## 2023-12-05 ASSESSMENT — TONOMETRY
OS_IOP_MMHG: 14
OD_IOP_MMHG: 13

## 2023-12-05 ASSESSMENT — VISUAL ACUITY
OU_CC: 20/20
OS_CC: 20/20
OD_CC: 20/20

## 2025-06-25 ENCOUNTER — COMPREHENSIVE EXAM (OUTPATIENT)
Age: 55
End: 2025-06-25

## 2025-06-25 DIAGNOSIS — H52.4: ICD-10-CM

## 2025-06-25 DIAGNOSIS — H52.203: ICD-10-CM

## 2025-06-25 DIAGNOSIS — H52.13: ICD-10-CM

## 2025-06-25 PROCEDURE — 92015 DETERMINE REFRACTIVE STATE: CPT

## 2025-06-25 PROCEDURE — 92014 COMPRE OPH EXAM EST PT 1/>: CPT

## 2025-07-14 ENCOUNTER — CONTACT LENSES/GLASSES VISIT (OUTPATIENT)
Age: 55
End: 2025-07-14

## 2025-07-14 DIAGNOSIS — H52.4: ICD-10-CM

## 2025-07-14 DIAGNOSIS — H52.203: ICD-10-CM

## 2025-07-14 DIAGNOSIS — H52.13: ICD-10-CM

## 2025-07-14 PROCEDURE — 92015GRNC REFRACTION GLASSES RECHECK - NO CHARGE
